# Patient Record
Sex: FEMALE | Race: OTHER | HISPANIC OR LATINO
[De-identification: names, ages, dates, MRNs, and addresses within clinical notes are randomized per-mention and may not be internally consistent; named-entity substitution may affect disease eponyms.]

---

## 2021-11-17 ENCOUNTER — APPOINTMENT (OUTPATIENT)
Dept: NEUROSURGERY | Facility: CLINIC | Age: 61
End: 2021-11-17
Payer: MEDICAID

## 2021-11-17 PROCEDURE — 99442: CPT | Mod: 95

## 2021-11-18 ENCOUNTER — APPOINTMENT (OUTPATIENT)
Dept: NEUROSURGERY | Facility: CLINIC | Age: 61
End: 2021-11-18

## 2021-12-23 ENCOUNTER — APPOINTMENT (OUTPATIENT)
Dept: NEUROSURGERY | Facility: CLINIC | Age: 61
End: 2021-12-23

## 2021-12-23 ENCOUNTER — APPOINTMENT (OUTPATIENT)
Dept: NEUROSURGERY | Facility: CLINIC | Age: 61
End: 2021-12-23
Payer: MEDICAID

## 2021-12-23 VITALS
HEIGHT: 57 IN | OXYGEN SATURATION: 95 % | DIASTOLIC BLOOD PRESSURE: 79 MMHG | HEART RATE: 83 BPM | WEIGHT: 134 LBS | TEMPERATURE: 97.6 F | BODY MASS INDEX: 28.91 KG/M2 | SYSTOLIC BLOOD PRESSURE: 136 MMHG

## 2021-12-23 PROCEDURE — 99213 OFFICE O/P EST LOW 20 MIN: CPT

## 2021-12-27 NOTE — PHYSICAL EXAM
[General Appearance - Alert] : alert [General Appearance - In No Acute Distress] : in no acute distress [Oriented To Time, Place, And Person] : oriented to person, place, and time [Impaired Insight] : insight and judgment were intact [Affect] : the affect was normal [Person] : oriented to person [Place] : oriented to place [Time] : oriented to time [Short Term Intact] : short term memory intact [Remote Intact] : remote memory intact [Span Intact] : the attention span was normal [Concentration Intact] : normal concentrating ability [Fluency] : fluency intact [Comprehension] : comprehension intact [Current Events] : adequate knowledge of current events [Past History] : adequate knowledge of personal past history [Vocabulary] : adequate range of vocabulary [Cranial Nerves Optic (II)] : visual acuity intact bilaterally,  pupils equal round and reactive to light [Cranial Nerves Oculomotor (III)] : extraocular motion intact [Cranial Nerves Trigeminal (V)] : facial sensation intact symmetrically [Cranial Nerves Facial (VII)] : face symmetrical [Cranial Nerves Vestibulocochlear (VIII)] : hearing was intact bilaterally [Cranial Nerves Glossopharyngeal (IX)] : tongue and palate midline [Cranial Nerves Accessory (XI - Cranial And Spinal)] : head turning and shoulder shrug symmetric [Cranial Nerves Hypoglossal (XII)] : there was no tongue deviation with protrusion [Motor Tone] : muscle tone was normal in all four extremities [Motor Strength] : muscle strength was normal in all four extremities [No Muscle Atrophy] : normal bulk in all four extremities [Sensation Tactile Decrease] : light touch was intact [Abnormal Walk] : normal gait [Balance] : balance was intact [2+] : Patella left 2+ [Past-pointing] : there was no past-pointing [Tremor] : no tremor present [FreeTextEntry5] : obvious frontal deformity on the right side. [FreeTextEntry1] : Osteoma on forehead

## 2021-12-27 NOTE — HISTORY OF PRESENT ILLNESS
[> 3 months] : more  than 3 months [de-identified] : Mrs. Montse Brunson, is a 60 y/o, female with a hx of osteoma on forehead and lupus. She states the area is painful especially to touch and feels that it is increasing in size. In addition to these she feels that it is more pronounced now.She is  today to discuss removal of osteoma .She reports she is now ready to undergo surgery. The osteoma is painful to palpation at times and causes her disturbance to her physical appearance.

## 2021-12-27 NOTE — ASSESSMENT
[FreeTextEntry1] : The patient is a 62 y/o, female with an  osteoma on forehead that she has noticed enlarging,  painful and disfiguring. At this point I think the best option is surgery to remove the lesion.\par - CT head discussed with pt in detail \par - Discussed risks and benefits of elective surgery. Pt will like to undergo excision. \par - Tentative surgery date provided.

## 2022-01-12 ENCOUNTER — OUTPATIENT (OUTPATIENT)
Dept: OUTPATIENT SERVICES | Facility: HOSPITAL | Age: 62
LOS: 1 days | End: 2022-01-12
Payer: MEDICAID

## 2022-01-12 VITALS
HEART RATE: 82 BPM | OXYGEN SATURATION: 98 % | HEIGHT: 60 IN | SYSTOLIC BLOOD PRESSURE: 110 MMHG | DIASTOLIC BLOOD PRESSURE: 70 MMHG | TEMPERATURE: 97 F | RESPIRATION RATE: 17 BRPM | WEIGHT: 139.99 LBS

## 2022-01-12 DIAGNOSIS — M32.9 SYSTEMIC LUPUS ERYTHEMATOSUS, UNSPECIFIED: ICD-10-CM

## 2022-01-12 DIAGNOSIS — D16.9 BENIGN NEOPLASM OF BONE AND ARTICULAR CARTILAGE, UNSPECIFIED: ICD-10-CM

## 2022-01-12 DIAGNOSIS — Z98.891 HISTORY OF UTERINE SCAR FROM PREVIOUS SURGERY: Chronic | ICD-10-CM

## 2022-01-12 DIAGNOSIS — Z90.710 ACQUIRED ABSENCE OF BOTH CERVIX AND UTERUS: Chronic | ICD-10-CM

## 2022-01-12 DIAGNOSIS — Z96.651 PRESENCE OF RIGHT ARTIFICIAL KNEE JOINT: Chronic | ICD-10-CM

## 2022-01-12 DIAGNOSIS — Z98.84 BARIATRIC SURGERY STATUS: Chronic | ICD-10-CM

## 2022-01-12 LAB
ANION GAP SERPL CALC-SCNC: 11 MMOL/L — SIGNIFICANT CHANGE UP (ref 7–14)
BLD GP AB SCN SERPL QL: NEGATIVE — SIGNIFICANT CHANGE UP
BUN SERPL-MCNC: 22 MG/DL — SIGNIFICANT CHANGE UP (ref 7–23)
CALCIUM SERPL-MCNC: 9.7 MG/DL — SIGNIFICANT CHANGE UP (ref 8.4–10.5)
CHLORIDE SERPL-SCNC: 105 MMOL/L — SIGNIFICANT CHANGE UP (ref 98–107)
CO2 SERPL-SCNC: 26 MMOL/L — SIGNIFICANT CHANGE UP (ref 22–31)
CREAT SERPL-MCNC: 0.54 MG/DL — SIGNIFICANT CHANGE UP (ref 0.5–1.3)
GLUCOSE SERPL-MCNC: 88 MG/DL — SIGNIFICANT CHANGE UP (ref 70–99)
HCT VFR BLD CALC: 38.9 % — SIGNIFICANT CHANGE UP (ref 34.5–45)
HGB BLD-MCNC: 12.3 G/DL — SIGNIFICANT CHANGE UP (ref 11.5–15.5)
MCHC RBC-ENTMCNC: 29.6 PG — SIGNIFICANT CHANGE UP (ref 27–34)
MCHC RBC-ENTMCNC: 31.6 GM/DL — LOW (ref 32–36)
MCV RBC AUTO: 93.5 FL — SIGNIFICANT CHANGE UP (ref 80–100)
NRBC # BLD: 0 /100 WBCS — SIGNIFICANT CHANGE UP
NRBC # FLD: 0 K/UL — SIGNIFICANT CHANGE UP
PLATELET # BLD AUTO: 245 K/UL — SIGNIFICANT CHANGE UP (ref 150–400)
POTASSIUM SERPL-MCNC: 4.2 MMOL/L — SIGNIFICANT CHANGE UP (ref 3.5–5.3)
POTASSIUM SERPL-SCNC: 4.2 MMOL/L — SIGNIFICANT CHANGE UP (ref 3.5–5.3)
RBC # BLD: 4.16 M/UL — SIGNIFICANT CHANGE UP (ref 3.8–5.2)
RBC # FLD: 14.1 % — SIGNIFICANT CHANGE UP (ref 10.3–14.5)
RH IG SCN BLD-IMP: POSITIVE — SIGNIFICANT CHANGE UP
SODIUM SERPL-SCNC: 142 MMOL/L — SIGNIFICANT CHANGE UP (ref 135–145)
WBC # BLD: 7.1 K/UL — SIGNIFICANT CHANGE UP (ref 3.8–10.5)
WBC # FLD AUTO: 7.1 K/UL — SIGNIFICANT CHANGE UP (ref 3.8–10.5)

## 2022-01-12 PROCEDURE — 93010 ELECTROCARDIOGRAM REPORT: CPT

## 2022-01-12 RX ORDER — SODIUM CHLORIDE 9 MG/ML
1000 INJECTION, SOLUTION INTRAVENOUS
Refills: 0 | Status: DISCONTINUED | OUTPATIENT
Start: 2022-01-26 | End: 2022-01-26

## 2022-01-12 NOTE — H&P PST ADULT - NEUROLOGICAL COMMENTS
pt reports of head aches in frontal area. pre op diagnosis - Osteoma on forehead- tenderness to palpation

## 2022-01-12 NOTE — H&P PST ADULT - RS GEN PE MLT RESP DETAILS PC
airway patent/breath sounds equal/good air movement/respirations non-labored/no subcutaneous emphysema

## 2022-01-12 NOTE — H&P PST ADULT - NSANTHOSAYNRD_GEN_A_CORE
No. JANNET screening performed.  STOP BANG Legend: 0-2 = LOW Risk; 3-4 = INTERMEDIATE Risk; 5-8 = HIGH Risk

## 2022-01-12 NOTE — H&P PST ADULT - PROBLEM SELECTOR PLAN 2
Patient instructed to take leflunomide, hydroxyquine with a sip of water on the morning of procedure.

## 2022-01-12 NOTE — H&P PST ADULT - PROBLEM SELECTOR PLAN 1
Pt is tentatively scheduled for Craniectomy for excision of osteoma on 01/26/22.  Pepcid provided for GI prophylaxis.  Pre op instructions givens  Pt strongly advised to follow up with surgeon to discuss COVID testing requirements prior to procedure.    Pt with history of Lupus, arthritis- Requesting medical clearance- pt already had seen her PCP on 01/10/22, will obtain clearance.

## 2022-01-12 NOTE — H&P PST ADULT - NEUROLOGICAL SYMPTOMS
Post discharge with PCP scheduled for 12/5/2017 at 1:00 p.m.  Information placed on the AVS.  Katy Castellanos LMSW, [facility]  11/28/2017   headache

## 2022-01-12 NOTE — H&P PST ADULT - HISTORY OF PRESENT ILLNESS
61 year old female with PMH of Lupus, Arthritis 61 year old female with PMH of Lupus, Arthritis, Glaucoma presented to PST with pre op diagnosis of Osteoma on forehead, for pre op evaluation prior to scheduled surgery- Craniectomy for excision of Osteoma. Pt reports of lump on forehead for 8 years, recently experiencing pain and discomfort, intermittent headaches too. Pt seen by  for excision.

## 2022-01-12 NOTE — H&P PST ADULT - NSICDXPASTSURGICALHX_GEN_ALL_CORE_FT
PAST SURGICAL HISTORY:  H/O  section     S/P gastric bypass     S/P hysterectomy     S/P total knee replacement, right

## 2022-01-25 ENCOUNTER — TRANSCRIPTION ENCOUNTER (OUTPATIENT)
Age: 62
End: 2022-01-25

## 2022-01-25 NOTE — ASU PATIENT PROFILE, ADULT - ARRIVAL TIME
11:30 Is This A New Presentation, Or A Follow-Up?: Growth Which Family Member (Optional)?: Grandfather

## 2022-01-26 ENCOUNTER — APPOINTMENT (OUTPATIENT)
Dept: NEUROSURGERY | Facility: HOSPITAL | Age: 62
End: 2022-01-26
Payer: MEDICAID

## 2022-01-26 ENCOUNTER — INPATIENT (INPATIENT)
Facility: HOSPITAL | Age: 62
LOS: 0 days | Discharge: HOME | End: 2022-01-26
Attending: NEUROLOGICAL SURGERY | Admitting: NEUROLOGICAL SURGERY
Payer: MEDICAID

## 2022-01-26 ENCOUNTER — RESULT REVIEW (OUTPATIENT)
Age: 62
End: 2022-01-26

## 2022-01-26 VITALS
DIASTOLIC BLOOD PRESSURE: 70 MMHG | OXYGEN SATURATION: 96 % | RESPIRATION RATE: 18 BRPM | HEART RATE: 67 BPM | SYSTOLIC BLOOD PRESSURE: 106 MMHG | TEMPERATURE: 97 F

## 2022-01-26 VITALS
WEIGHT: 139.99 LBS | HEIGHT: 60 IN | TEMPERATURE: 99 F | SYSTOLIC BLOOD PRESSURE: 137 MMHG | HEART RATE: 75 BPM | DIASTOLIC BLOOD PRESSURE: 74 MMHG | OXYGEN SATURATION: 100 % | RESPIRATION RATE: 16 BRPM

## 2022-01-26 DIAGNOSIS — Z90.710 ACQUIRED ABSENCE OF BOTH CERVIX AND UTERUS: Chronic | ICD-10-CM

## 2022-01-26 DIAGNOSIS — D16.9 BENIGN NEOPLASM OF BONE AND ARTICULAR CARTILAGE, UNSPECIFIED: ICD-10-CM

## 2022-01-26 DIAGNOSIS — Z98.891 HISTORY OF UTERINE SCAR FROM PREVIOUS SURGERY: Chronic | ICD-10-CM

## 2022-01-26 DIAGNOSIS — Z96.651 PRESENCE OF RIGHT ARTIFICIAL KNEE JOINT: Chronic | ICD-10-CM

## 2022-01-26 DIAGNOSIS — Z98.84 BARIATRIC SURGERY STATUS: Chronic | ICD-10-CM

## 2022-01-26 LAB — RH IG SCN BLD-IMP: POSITIVE — SIGNIFICANT CHANGE UP

## 2022-01-26 PROCEDURE — 88307 TISSUE EXAM BY PATHOLOGIST: CPT | Mod: 26

## 2022-01-26 PROCEDURE — 88311 DECALCIFY TISSUE: CPT | Mod: 26

## 2022-01-26 PROCEDURE — 61500 CRNEC EXC TUM/BONE LES SKULL: CPT

## 2022-01-26 DEVICE — SURGIFOAM PAD 8CM X 12.5CM X 2MM (100C): Type: IMPLANTABLE DEVICE | Status: FUNCTIONAL

## 2022-01-26 DEVICE — SURGIFLO MATRIX WITH THROMBIN KIT: Type: IMPLANTABLE DEVICE | Status: FUNCTIONAL

## 2022-01-26 DEVICE — BONE WAX 2.5GM: Type: IMPLANTABLE DEVICE | Status: FUNCTIONAL

## 2022-01-26 RX ORDER — DULOXETINE HYDROCHLORIDE 30 MG/1
60 CAPSULE, DELAYED RELEASE ORAL DAILY
Refills: 0 | Status: DISCONTINUED | OUTPATIENT
Start: 2022-01-26 | End: 2022-01-26

## 2022-01-26 RX ORDER — GABAPENTIN 400 MG/1
800 CAPSULE ORAL DAILY
Refills: 0 | Status: DISCONTINUED | OUTPATIENT
Start: 2022-01-26 | End: 2022-01-26

## 2022-01-26 RX ORDER — OXYCODONE HYDROCHLORIDE 5 MG/1
10 TABLET ORAL EVERY 4 HOURS
Refills: 0 | Status: DISCONTINUED | OUTPATIENT
Start: 2022-01-26 | End: 2022-01-26

## 2022-01-26 RX ORDER — OXYCODONE HYDROCHLORIDE 5 MG/1
5 TABLET ORAL EVERY 4 HOURS
Refills: 0 | Status: DISCONTINUED | OUTPATIENT
Start: 2022-01-26 | End: 2022-01-26

## 2022-01-26 RX ORDER — CEPHALEXIN 500 MG
1 CAPSULE ORAL
Qty: 2 | Refills: 0
Start: 2022-01-26 | End: 2022-01-26

## 2022-01-26 RX ORDER — LATANOPROST 0.05 MG/ML
1 SOLUTION/ DROPS OPHTHALMIC; TOPICAL AT BEDTIME
Refills: 0 | Status: DISCONTINUED | OUTPATIENT
Start: 2022-01-26 | End: 2022-01-26

## 2022-01-26 RX ORDER — HYDROXYCHLOROQUINE SULFATE 200 MG
200 TABLET ORAL DAILY
Refills: 0 | Status: DISCONTINUED | OUTPATIENT
Start: 2022-01-26 | End: 2022-01-26

## 2022-01-26 RX ORDER — HYDROXYCHLOROQUINE SULFATE 200 MG
1 TABLET ORAL
Qty: 0 | Refills: 0 | DISCHARGE

## 2022-01-26 RX ORDER — LATANOPROST 0.05 MG/ML
1 SOLUTION/ DROPS OPHTHALMIC; TOPICAL
Qty: 0 | Refills: 0 | DISCHARGE

## 2022-01-26 RX ORDER — ALENDRONATE SODIUM 70 MG/1
1 TABLET ORAL
Qty: 0 | Refills: 0 | DISCHARGE

## 2022-01-26 RX ORDER — DULOXETINE HYDROCHLORIDE 30 MG/1
1 CAPSULE, DELAYED RELEASE ORAL
Qty: 0 | Refills: 0 | DISCHARGE

## 2022-01-26 RX ORDER — TRAMADOL HYDROCHLORIDE 50 MG/1
1 TABLET ORAL
Qty: 0 | Refills: 0 | DISCHARGE

## 2022-01-26 RX ORDER — LEFLUNOMIDE 10 MG/1
1 TABLET ORAL
Qty: 0 | Refills: 0 | DISCHARGE

## 2022-01-26 RX ORDER — GABAPENTIN 400 MG/1
1 CAPSULE ORAL
Qty: 0 | Refills: 0 | DISCHARGE

## 2022-01-26 NOTE — ASU DISCHARGE PLAN (ADULT/PEDIATRIC) - CARE PROVIDER_API CALL
Kd Frederick (MD; CHUY; MS)  Neurosurgery  [address], [address]  [place] [address]  Phone: [phone]  Fax: [phone]  Follow Up Time: 1 week

## 2022-01-26 NOTE — ASU DISCHARGE PLAN (ADULT/PEDIATRIC) - NS MD DC FALL RISK RISK
For information on Fall & Injury Prevention, visit: [email or web address] OR  [email or web address] OR  https://www.cdc.gov/steadi/patient.html

## 2022-01-26 NOTE — ASU DISCHARGE PLAN (ADULT/PEDIATRIC) - FOLLOW UP APPOINTMENTS
May also call Recovery Room (PACU) 24/7 @ [phone]/[facility]

## 2022-01-26 NOTE — ASU DISCHARGE PLAN (ADULT/PEDIATRIC) - NURSING INSTRUCTIONS
You received IV Tylenol for pain management at _4:45pm__. Please DO NOT take any Tylenol (Acetaminophen) containing products, such as Vicodin, Percocet, Excedrin, and cold medications for the next 6 hours (until __10:45_ PM). DO NOT TAKE MORE THAN 3000 MG OF TYLENOL in a 24 hour period.

## 2022-01-26 NOTE — ASU PREOP CHECKLIST - HEART RATE (BEATS/MIN)
Trilobed Flap Text: The defect edges were debeveled with a #15 scalpel blade.  Given the location of the defect and the proximity to free margins a trilobed flap was deemed most appropriate.  Using a sterile surgical marker, an appropriate trilobed flap drawn around the defect.    The area thus outlined was incised deep to adipose tissue with a #15 scalpel blade.  The skin margins were undermined to an appropriate distance in all directions utilizing iris scissors. Undermining Location (Optional): in the deep fat Graft Donor Site Will Heal By Secondary Intention: No Suturegard Retention Suture: 2-0 Nylon Double M-Plasty Intermediate Repair Preamble Text (Leave Blank If You Do Not Want): Undermining was performed with blunt dissection. Paramedian Forehead Flap Division And Inset Text: Division and inset of the paramedian forehead flap was performed to achieve optimal aesthetic result, restore normal anatomic appearance and avoid distortion of normal anatomy, expedite and facilitate wound healing, achieve optimal functional result and because linear closure either not possible or would produce suboptimal result. The patient was prepped and draped in the usual manner. The pedicle was infiltrated with local anesthesia. The pedicle was sectioned with a #15 blade. The pedicle was de-bulked and trimmed to match the shape of the defect. Hemostasis was achieved. The flap donor site and free margin of the flap were secured with deep buried sutures and the wound edges were re-approximated. Mastoid Interpolation Flap Text: A decision was made to reconstruct the defect utilizing an interpolation axial flap and a staged reconstruction.  A telfa template was made of the defect.  This telfa template was then used to outline the mastoid interpolation flap.  The donor area for the pedicle flap was then injected with anesthesia.  The flap was excised through the skin and subcutaneous tissue down to the layer of the underlying musculature.  The pedicle flap was carefully excised within this deep plane to maintain its blood supply.  The edges of the donor site were undermined.   The donor site was closed in a primary fashion.  The pedicle was then rotated into position and sutured.  Once the tube was sutured into place, adequate blood supply was confirmed with blanching and refill.  The pedicle was then wrapped with xeroform gauze and dressed appropriately with a telfa and gauze bandage to ensure continued blood supply and protect the attached pedicle. O-Z Flap Text: The defect edges were debeveled with a #15 scalpel blade.  Given the location of the defect, shape of the defect and the proximity to free margins an O-Z flap was deemed most appropriate.  Using a sterile surgical marker, an appropriate transposition flap was drawn incorporating the defect and placing the expected incisions within the relaxed skin tension lines where possible. The area thus outlined was incised deep to adipose tissue with a #15 scalpel blade.  The skin margins were undermined to an appropriate distance in all directions utilizing iris scissors. Purse String (Intermediate) Text: Given the location of the defect and the characteristics of the surrounding skin a purse string intermediate closure was deemed most appropriate.  Undermining was performed circumfirentially around the surgical defect.  A purse string suture was then placed and tightened. Closure 3 Information: This tab is for additional flaps and grafts above and beyond our usual structured repairs.  Please note if you enter information here it will not currently bill and you will need to add the billing information manually. Bilobed Transposition Flap Text: The defect edges were debeveled with a #15 scalpel blade.  Given the location of the defect and the proximity to free margins a bilobed transposition flap was deemed most appropriate.  Using a sterile surgical marker, an appropriate bilobe flap drawn around the defect.    The area thus outlined was incised deep to adipose tissue with a #15 scalpel blade.  The skin margins were undermined to an appropriate distance in all directions utilizing iris scissors. Non-Graft Cartilage Fenestration Text: The cartilage was fenestrated with a 2mm punch biopsy to help facilitate healing. Posterior Auricular Interpolation Flap Text: A decision was made to reconstruct the defect utilizing an interpolation axial flap and a staged reconstruction.  A telfa template was made of the defect.  This telfa template was then used to outline the posterior auricular interpolation flap.  The donor area for the pedicle flap was then injected with anesthesia.  The flap was excised through the skin and subcutaneous tissue down to the layer of the underlying musculature.  The pedicle flap was carefully excised within this deep plane to maintain its blood supply.  The edges of the donor site were undermined.   The donor site was closed in a primary fashion.  The pedicle was then rotated into position and sutured.  Once the tube was sutured into place, adequate blood supply was confirmed with blanching and refill.  The pedicle was then wrapped with xeroform gauze and dressed appropriately with a telfa and gauze bandage to ensure continued blood supply and protect the attached pedicle. Double O-Z Flap Text: The defect edges were debeveled with a #15 scalpel blade.  Given the location of the defect, shape of the defect and the proximity to free margins a Double O-Z flap was deemed most appropriate.  Using a sterile surgical marker, an appropriate transposition flap was drawn incorporating the defect and placing the expected incisions within the relaxed skin tension lines where possible. The area thus outlined was incised deep to adipose tissue with a #15 scalpel blade.  The skin margins were undermined to an appropriate distance in all directions utilizing iris scissors. Partial Purse String (Simple) Text: Given the location of the defect and the characteristics of the surrounding skin a simple purse string closure was deemed most appropriate.  Undermining was performed circumfirentially around the surgical defect.  A purse string suture was then placed and tightened. Wound tension only allowed a partial closure of the circular defect. Posterior Auricular Interpolation Flap Division And Inset Text: Division and inset of the posterior auricular interpolation flap was performed to achieve optimal aesthetic result, restore normal anatomic appearance and avoid distortion of normal anatomy, expedite and facilitate wound healing, achieve optimal functional result and because linear closure either not possible or would produce suboptimal result. The patient was prepped and draped in the usual manner. The pedicle was infiltrated with local anesthesia. The pedicle was sectioned with a #15 blade. The pedicle was de-bulked and trimmed to match the shape of the defect. Hemostasis was achieved. The flap donor site and free margin of the flap were secured with deep buried sutures and the wound edges were re-approximated. Graft Donor Site Dermal Sutures (Optional): 5-0 Monocryl Bilobed Flap Text: The defect edges were debeveled with a #15 scalpel blade.  Given the location of the defect and the proximity to free margins a bilobe flap was deemed most appropriate.  Using a sterile surgical marker, an appropriate bilobe flap drawn around the defect.    The area thus outlined was incised deep to adipose tissue with a #15 scalpel blade.  The skin margins were undermined to an appropriate distance in all directions utilizing iris scissors. Suture Removal: 9 days Retention Suture Bite Size: 3 mm Skin Substitute: EpiFix Micronized V-Y Flap Text: The defect edges were debeveled with a #15 scalpel blade.  Given the location of the defect, shape of the defect and the proximity to free margins a V-Y flap was deemed most appropriate.  Using a sterile surgical marker, an appropriate advancement flap was drawn incorporating the defect and placing the expected incisions within the relaxed skin tension lines where possible.    The area thus outlined was incised deep to adipose tissue with a #15 scalpel blade.  The skin margins were undermined to an appropriate distance in all directions utilizing iris scissors. Consent: The rationale for the repair was explained to the patient and consent was obtained. The risks, benefits and alternatives to therapy were discussed in detail. Specifically, the risks of infection, scarring, bleeding, prolonged wound healing, incomplete removal, allergy to anesthesia, nerve injury and recurrence were addressed. Prior to the procedure, the treatment site was clearly identified and confirmed by the patient. All components of Universal Protocol/PAUSE Rule completed. Paramedian Forehead Flap Text: A decision was made to reconstruct the defect utilizing an interpolation axial flap and a staged reconstruction.  A telfa template was made of the defect.  This telfa template was then used to outline the paramedian forehead pedicle flap.  The donor area for the pedicle flap was then injected with anesthesia.  The flap was excised through the skin and subcutaneous tissue down to the layer of the underlying musculature.  The pedicle flap was carefully excised within this deep plane to maintain its blood supply.  The edges of the donor site were undermined.   The donor site was closed in a primary fashion.  The pedicle was then rotated into position and sutured.  Once the tube was sutured into place, adequate blood supply was confirmed with blanching and refill.  The pedicle was then wrapped with xeroform gauze and dressed appropriately with a telfa and gauze bandage to ensure continued blood supply and protect the attached pedicle. Graft Cartilage Fenestration Text: The cartilage was fenestrated with a 2mm punch biopsy to help facilitate graft survival and healing. Partial Purse String (Intermediate) Text: Given the location of the defect and the characteristics of the surrounding skin an intermediate purse string closure was deemed most appropriate.  Undermining was performed circumfirentially around the surgical defect.  A purse string suture was then placed and tightened. Wound tension only allowed a partial closure of the circular defect. Manual Repair Warning Statement: We plan on removing the manually selected variable below in favor of our much easier automatic structured text blocks found in the previous tab. We decided to do this to help make the flow better and give you the full power of structured data. Manual selection is never going to be ideal in our platform and I would encourage you to avoid using manual selection from this point on, especially since I will be sunsetting this feature. It is important that you do one of two things with the customized text below. First, you can save all of the text in a word file so you can have it for future reference. Second, transfer the text to the appropriate area in the Library tab. Lastly, if there is a flap or graft type which we do not have you need to let us know right away so I can add it in before the variable is hidden. No need to panic, we plan to give you roughly 6 months to make the change. Banner Transposition Flap Text: The defect edges were debeveled with a #15 scalpel blade.  Given the location of the defect and the proximity to free margins a Banner transposition flap was deemed most appropriate.  Using a sterile surgical marker, an appropriate flap drawn around the defect. The area thus outlined was incised deep to adipose tissue with a #15 scalpel blade.  The skin margins were undermined to an appropriate distance in all directions utilizing iris scissors. Secondary Intention Text (Leave Blank If You Do Not Want): The defect will heal with secondary intention. Cheiloplasty (Less Than 50%) Text: A decision was made to reconstruct the defect with a  cheiloplasty.  The defect was undermined extensively.  Additional obicularis oris muscle was excised with a 15 blade scalpel.  The defect was converted into a full thickness wedge, of less than 50% of the vertical height of the lip, to facilite a better cosmetic result.  Small vessels were then tied off with 5-0 monocyrl. The obicularis oris, superficial fascia, adipose and dermis were then reapproximated.  After the deeper layers were approximated the epidermis was reapproximated with particular care given to realign the vermilion border. Post-Care Instructions: I reviewed with the patient in detail post-care instructions. Patient is not to engage in any heavy lifting, exercise, or swimming for the next 9 days. Should the patient develop any fevers, chills, bleeding, severe pain patient will contact the office immediately. Localized Dermabrasion Text: The patient was draped in routine manner.  Localized dermabrasion using 3 x 17 mm wire brush was performed in routine manner to papillary dermis. This spot dermabrasion is being performed to complete skin cancer reconstruction. It also will eliminate the other sun damaged precancerous cells that are known to be part of the regional effect of a lifetime's worth of sun exposure. This localized dermabrasion is therapeutic and should not be considered cosmetic in any regard. Advancement-Rotation Flap Text: The defect edges were debeveled with a #15 scalpel blade.  Given the location of the defect, shape of the defect and the proximity to free margins an advancement-rotation flap was deemed most appropriate.  Using a sterile surgical marker, an appropriate flap was drawn incorporating the defect and placing the expected incisions within the relaxed skin tension lines where possible. The area thus outlined was incised deep to adipose tissue with a #15 scalpel blade.  The skin margins were undermined to an appropriate distance in all directions utilizing iris scissors. Graft Donor Site Epidermal Sutures (Optional): 5-0 Prolene Ear Star Wedge Flap Text: The defect edges were debeveled with a #15 blade scalpel.  Given the location of the defect and the proximity to free margins (helical rim) an ear star wedge flap was deemed most appropriate.  Using a sterile surgical marker, the appropriate flap was drawn incorporating the defect and placing the expected incisions between the helical rim and antihelix where possible.  The area thus outlined was incised through and through with a #15 scalpel blade. Epidermal Closure: simple interrupted No Repair - Repaired With Adjacent Surgical Defect Text (Leave Blank If You Do Not Want): After obtaining clear surgical margins the defect was repaired concurrently with another surgical defect which was in close approximation. Length To Time In Minutes Device Was In Place: 10 Skin Substitute Units (Will Override Primary Defect Units If Greater Than 0): 0 Mercedes Flap Text: The defect edges were debeveled with a #15 scalpel blade.  Given the location of the defect, shape of the defect and the proximity to free margins a Mercedes flap was deemed most appropriate.  Using a sterile surgical marker, an appropriate advancement flap was drawn incorporating the defect and placing the expected incisions within the relaxed skin tension lines where possible. The area thus outlined was incised deep to adipose tissue with a #15 scalpel blade.  The skin margins were undermined to an appropriate distance in all directions utilizing iris scissors. Tarsorrhaphy Text: A tarsorrhaphy was performed using Frost sutures. 75 Bilateral Helical Rim Advancement Flap Text: The defect edges were debeveled with a #15 blade scalpel.  Given the location of the defect and the proximity to free margins (helical rim) a bilateral helical rim advancement flap was deemed most appropriate.  Using a sterile surgical marker, the appropriate advancement flaps were drawn incorporating the defect and placing the expected incisions between the helical rim and antihelix where possible.  The area thus outlined was incised through and through with a #15 scalpel blade.  With a skin hook and iris scissors, the flaps were gently and sharply undermined and freed up. Cheiloplasty (Complex) Text: A decision was made to reconstruct the defect with a  cheiloplasty.  The defect was undermined extensively.  Additional obicularis oris muscle was excised with a 15 blade scalpel.  The defect was converted into a full thickness wedge to facilite a better cosmetic result.  Small vessels were then tied off with 5-0 monocyrl. The obicularis oris, superficial fascia, adipose and dermis were then reapproximated.  After the deeper layers were approximated the epidermis was reapproximated with particular care given to realign the vermilion border. Anesthesia Type: 1% lidocaine with 1:100,000 epinephrine and 408mcg clindamycin/ml and a 1:10 solution of 8.4% sodium bicarbonate Modified Advancement Flap Text: The defect edges were debeveled with a #15 scalpel blade.  Given the location of the defect, shape of the defect and the proximity to free margins a modified advancement flap was deemed most appropriate.  Using a sterile surgical marker, an appropriate advancement flap was drawn incorporating the defect and placing the expected incisions within the relaxed skin tension lines where possible.    The area thus outlined was incised deep to adipose tissue with a #15 scalpel blade.  The skin margins were undermined to an appropriate distance in all directions utilizing iris scissors. Closure 4 Information: This tab is for additional flaps and grafts above and beyond our usual structured repairs.  Please note if you enter information here it will not currently bill and you will need to add the billing information manually. Referred To Oculoplastics For Closure Text (Leave Blank If You Do Not Want): After obtaining clear surgical margins the patient was sent to oculoplastics for surgical repair.  The patient understands they will receive post-surgical care and follow-up from the referring physician's office. Complex Repair And Flap Additional Text (Will Appearing After The Standard Complex Repair Text): The complex repair was not sufficient to completely close the primary defect. The remaining additional defect was repaired with the flap mentioned below. Helical Rim Advancement Flap Text: The defect edges were debeveled with a #15 blade scalpel.  Given the location of the defect and the proximity to free margins (helical rim) a double helical rim advancement flap was deemed most appropriate.  Using a sterile surgical marker, the appropriate advancement flaps were drawn incorporating the defect and placing the expected incisions between the helical rim and antihelix where possible.  The area thus outlined was incised through and through with a #15 scalpel blade.  With a skin hook and iris scissors, the flaps were gently and sharply undermined and freed up. Ear Wedge Repair Text: A wedge excision was completed by carrying down an excision through the full thickness of the ear and cartilage with an inward facing Burow's triangle. The wound was then closed in a layered fashion. Type Of Previous Surgery (Optional- Ie Mohs Surgery): Slow Mons margins clear Referred To Otolaryngology For Closure Text (Leave Blank If You Do Not Want): After obtaining clear surgical margins the patient was sent to otolaryngology for surgical repair.  The patient understands they will receive post-surgical care and follow-up from the referring physician's office. Complex Repair And Graft Additional Text (Will Appearing After The Standard Complex Repair Text): The complex repair was not sufficient to completely close the primary defect. The remaining additional defect was repaired with the graft mentioned below. Mucosal Advancement Flap Text: Given the location of the defect, shape of the defect and the proximity to free margins a mucosal advancement flap was deemed most appropriate. Incisions were made with a 15 blade scalpel in the appropriate fashion along the cutaneous vermilion border and the mucosal lip. The remaining actinically damaged mucosal tissue was excised.  The mucosal advancement flap was then elevated to the gingival sulcus with care taken to preserve the neurovascular structures and advanced into the primary defect. Care was taken to ensure that precise realignment of the vermilion border was achieved. Bi-Rhombic Flap Text: The defect edges were debeveled with a #15 scalpel blade.  Given the location of the defect and the proximity to free margins a bi-rhombic flap was deemed most appropriate.  Using a sterile surgical marker, an appropriate rhombic flap was drawn incorporating the defect. The area thus outlined was incised deep to adipose tissue with a #15 scalpel blade.  The skin margins were undermined to an appropriate distance in all directions utilizing iris scissors. Full Thickness Lip Wedge Repair (Flap) Text: Given the location of the defect and the proximity to free margins a full thickness wedge repair was deemed most appropriate.  Using a sterile surgical marker, the appropriate repair was drawn incorporating the defect and placing the expected incisions perpendicular to the vermilion border.  The vermilion border was also meticulously outlined to ensure appropriate reapproximation during the repair.  The area thus outlined was incised through and through with a #15 scalpel blade.  The muscularis and dermis were reaproximated with deep sutures following hemostasis. Care was taken to realign the vermilion border before proceeding with the superficial closure.  Once the vermilion was realigned the superfical and mucosal closure was finished. Show Asc Variables: Yes Graft Donor Site Bandage (Optional-Leave Blank If You Don't Want In Note): Steri-strips and a pressure bandage were applied to the donor site. Hatchet Flap Text: The defect edges were debeveled with a #15 scalpel blade.  Given the location of the defect, shape of the defect and the proximity to free margins a hatchet flap was deemed most appropriate.  Using a sterile surgical marker, an appropriate hatchet flap was drawn incorporating the defect and placing the expected incisions within the relaxed skin tension lines where possible.    The area thus outlined was incised deep to adipose tissue with a #15 scalpel blade.  The skin margins were undermined to an appropriate distance in all directions utilizing iris scissors. O-T Plasty Text: The defect edges were debeveled with a #15 scalpel blade.  Given the location of the defect, shape of the defect and the proximity to free margins an O-T plasty was deemed most appropriate.  Using a sterile surgical marker, an appropriate O-T plasty was drawn incorporating the defect and placing the expected incisions within the relaxed skin tension lines where possible.    The area thus outlined was incised deep to adipose tissue with a #15 scalpel blade.  The skin margins were undermined to an appropriate distance in all directions utilizing iris scissors. Rhomboid Transposition Flap Text: The defect edges were debeveled with a #15 scalpel blade.  Given the location of the defect and the proximity to free margins a rhomboid transposition flap was deemed most appropriate.  Using a sterile surgical marker, an appropriate rhomboid flap was drawn incorporating the defect.    The area thus outlined was incised deep to adipose tissue with a #15 scalpel blade.  The skin margins were undermined to an appropriate distance in all directions utilizing iris scissors. Referred To Plastics For Closure Text (Leave Blank If You Do Not Want): After obtaining clear surgical margins the patient was sent to plastics for surgical repair.  The patient understands they will receive post-surgical care and follow-up from the referring physician's office. Ftsg Text: The defect edges were debeveled with a #15 scalpel blade.  Given the location of the defect, shape of the defect and the proximity to free margins a full thickness skin graft was deemed most appropriate.  Using a sterile surgical marker, the primary defect shape was transferred to the donor site. The area thus outlined was incised deep to adipose tissue with a #15 scalpel blade.  The harvested graft was then trimmed of adipose tissue until only dermis and epidermis was left.  The skin margins of the secondary defect were undermined to an appropriate distance in all directions utilizing iris scissors.  The secondary defect was closed with interrupted buried subcutaneous sutures.  The skin edges were then re-apposed with running  sutures.  The skin graft was then placed in the primary defect and oriented appropriately. Wound Care: Petrolatum Referred To Asc For Closure Text (Leave Blank If You Do Not Want): After obtaining clear surgical margins the patient was sent to an ASC for surgical repair.  The patient understands they will receive post-surgical care and follow-up from the ASC physician. Information: Selecting Yes will display possible errors in your note based on the variables you have selected. This validation is only offered as a suggestion for you. PLEASE NOTE THAT THE VALIDATION TEXT WILL BE REMOVED WHEN YOU FINALIZE YOUR NOTE. IF YOU WANT TO FAX A PRELIMINARY NOTE YOU WILL NEED TO TOGGLE THIS TO 'NO' IF YOU DO NOT WANT IT IN YOUR FAXED NOTE. O-Z Plasty Text: The defect edges were debeveled with a #15 scalpel blade.  Given the location of the defect, shape of the defect and the proximity to free margins an O-Z plasty (double transposition flap) was deemed most appropriate.  Using a sterile surgical marker, the appropriate transposition flaps were drawn incorporating the defect and placing the expected incisions within the relaxed skin tension lines where possible.    The area thus outlined was incised deep to adipose tissue with a #15 scalpel blade.  The skin margins were undermined to an appropriate distance in all directions utilizing iris scissors.  Hemostasis was achieved with electrocautery.  The flaps were then transposed into place, one clockwise and the other counterclockwise, and anchored with interrupted buried subcutaneous sutures. Rhombic Flap Text: The defect edges were debeveled with a #15 scalpel blade.  Given the location of the defect and the proximity to free margins a rhombic flap was deemed most appropriate.  Using a sterile surgical marker, an appropriate rhombic flap was drawn incorporating the defect.    The area thus outlined was incised deep to adipose tissue with a #15 scalpel blade.  The skin margins were undermined to an appropriate distance in all directions utilizing iris scissors. Split-Thickness Skin Graft Text: The defect edges were debeveled with a #15 scalpel blade.  Given the location of the defect, shape of the defect and the proximity to free margins a split thickness skin graft was deemed most appropriate.  Using a sterile surgical marker, the primary defect shape was transferred to the donor site. The split thickness graft was then harvested.  The skin graft was then placed in the primary defect and oriented appropriately. Additional Anesthesia Volume In Cc: 6 Closure 2 Information: This tab is for additional flaps and grafts, including complex repair and grafts and complex repair and flaps. You can also specify a different location for the additional defect, if the location is the same you do not need to select a new one. We will insert the automated text for the repair you select below just as we do for solitary flaps and grafts. Please note that at this time if you select a location with a different insurance zone you will need to override the ICD10 and CPT if appropriate. Double O-Z Plasty Text: The defect edges were debeveled with a #15 scalpel blade.  Given the location of the defect, shape of the defect and the proximity to free margins a Double O-Z plasty (double transposition flap) was deemed most appropriate.  Using a sterile surgical marker, the appropriate transposition flaps were drawn incorporating the defect and placing the expected incisions within the relaxed skin tension lines where possible. The area thus outlined was incised deep to adipose tissue with a #15 scalpel blade.  The skin margins were undermined to an appropriate distance in all directions utilizing iris scissors.  Hemostasis was achieved with electrocautery.  The flaps were then transposed into place, one clockwise and the other counterclockwise, and anchored with interrupted buried subcutaneous sutures. Melolabial Transposition Flap Text: The defect edges were debeveled with a #15 scalpel blade.  Given the location of the defect and the proximity to free margins a melolabial flap was deemed most appropriate.  Using a sterile surgical marker, an appropriate melolabial transposition flap was drawn incorporating the defect.    The area thus outlined was incised deep to adipose tissue with a #15 scalpel blade.  The skin margins were undermined to an appropriate distance in all directions utilizing iris scissors. Cartilage Graft Text: The defect edges were debeveled with a #15 scalpel blade.  Given the location of the defect, shape of the defect, the fact the defect involved a full thickness cartilage defect a cartilage graft was deemed most appropriate.  An appropriate donor site was identified, cleansed, and anesthetized. The cartilage graft was then harvested and transferred to the recipient site, oriented appropriately and then sutured into place.  The secondary defect was then repaired using a primary closure. Referred To Mid-Level For Closure Text (Leave Blank If You Do Not Want): After obtaining clear surgical margins the patient was sent to a mid-level provider for surgical repair.  The patient understands they will receive post-surgical care and follow-up from the mid-level provider. Dressing: xeroform gauze Hemostasis: Electrocautery Complex Repair Preamble Text (Leave Blank If You Do Not Want): Extensive wide undermining was performed. S Plasty Text: Given the location and shape of the defect, and the orientation of relaxed skin tension lines, an S-plasty was deemed most appropriate for repair.  Using a sterile surgical marker, the appropriate outline of the S-plasty was drawn, incorporating the defect and placing the expected incisions within the relaxed skin tension lines where possible.  The area thus outlined was incised deep to adipose tissue with a #15 scalpel blade.  The skin margins were undermined to an appropriate distance in all directions utilizing iris scissors. The skin flaps were advanced over the defect.  The opposing margins were then approximated with interrupted buried subcutaneous sutures. Composite Graft Text: The defect edges were debeveled with a #15 scalpel blade.  Given the location of the defect, shape of the defect, the proximity to free margins and the fact the defect was full thickness a composite graft was deemed most appropriate.  The defect was outline and then transferred to the donor site.  A full thickness graft was then excised from the donor site. The graft was then placed in the primary defect, oriented appropriately and then sutured into place.  The secondary defect was then repaired using a primary closure. Repair Performed By Another Provider Text (Leave Blank If You Do Not Want): After obtaining clear surgical margins the defect was repaired by another provider. Graft Type: Full Thickness Skin Graft Muscle Hinge Flap Text: The defect edges were debeveled with a #15 scalpel blade.  Given the size, depth and location of the defect and the proximity to free margins a muscle hinge flap was deemed most appropriate.  Using a sterile surgical marker, an appropriate hinge flap was drawn incorporating the defect. The area thus outlined was incised with a #15 scalpel blade.  The skin margins were undermined to an appropriate distance in all directions utilizing iris scissors. Advancement Flap (Single) Text: The defect edges were debeveled with a #15 scalpel blade.  Given the location of the defect and the proximity to free margins a single advancement flap was deemed most appropriate.  Using a sterile surgical marker, an appropriate advancement flap was drawn incorporating the defect and placing the expected incisions within the relaxed skin tension lines where possible.    The area thus outlined was incised deep to adipose tissue with a #15 scalpel blade.  The skin margins were undermined to an appropriate distance in all directions utilizing iris scissors. V-Y Plasty Text: The defect edges were debeveled with a #15 scalpel blade.  Given the location of the defect, shape of the defect and the proximity to free margins an V-Y advancement flap was deemed most appropriate.  Using a sterile surgical marker, an appropriate advancement flap was drawn incorporating the defect and placing the expected incisions within the relaxed skin tension lines where possible.    The area thus outlined was incised deep to adipose tissue with a #15 scalpel blade.  The skin margins were undermined to an appropriate distance in all directions utilizing iris scissors. Epidermal Autograft Text: The defect edges were debeveled with a #15 scalpel blade.  Given the location of the defect, shape of the defect and the proximity to free margins an epidermal autograft was deemed most appropriate.  Using a sterile surgical marker, the primary defect shape was transferred to the donor site. The epidermal graft was then harvested.  The skin graft was then placed in the primary defect and oriented appropriately. Estimated Blood Loss (Cc): minimal Keystone Flap Text: The defect edges were debeveled with a #15 scalpel blade.  Given the location of the defect, shape of the defect a keystone flap was deemed most appropriate.  Using a sterile surgical marker, an appropriate keystone flap was drawn incorporating the defect, outlining the appropriate donor tissue and placing the expected incisions within the relaxed skin tension lines where possible. The area thus outlined was incised deep to adipose tissue with a #15 scalpel blade.  The skin margins were undermined to an appropriate distance in all directions around the primary defect and laterally outward around the flap utilizing iris scissors. Transposition Flap Text: The defect edges were debeveled with a #15 scalpel blade.  Given the location of the defect and the proximity to free margins a transposition flap was deemed most appropriate.  Using a sterile surgical marker, an appropriate transposition flap was drawn incorporating the defect.    The area thus outlined was incised deep to adipose tissue with a #15 scalpel blade.  The skin margins were undermined to an appropriate distance in all directions utilizing iris scissors. Advancement Flap (Double) Text: The defect edges were debeveled with a #15 scalpel blade.  Given the location of the defect and the proximity to free margins a double advancement flap was deemed most appropriate.  Using a sterile surgical marker, the appropriate advancement flaps were drawn incorporating the defect and placing the expected incisions within the relaxed skin tension lines where possible.    The area thus outlined was incised deep to adipose tissue with a #15 scalpel blade.  The skin margins were undermined to an appropriate distance in all directions utilizing iris scissors. H Plasty Text: Given the location of the defect, shape of the defect and the proximity to free margins a H-plasty was deemed most appropriate for repair.  Using a sterile surgical marker, the appropriate advancement arms of the H-plasty were drawn incorporating the defect and placing the expected incisions within the relaxed skin tension lines where possible. The area thus outlined was incised deep to adipose tissue with a #15 scalpel blade. The skin margins were undermined to an appropriate distance in all directions utilizing iris scissors.  The opposing advancement arms were then advanced into place in opposite direction and anchored with interrupted buried subcutaneous sutures. Dermal Autograft Text: The defect edges were debeveled with a #15 scalpel blade.  Given the location of the defect, shape of the defect and the proximity to free margins a dermal autograft was deemed most appropriate.  Using a sterile surgical marker, the primary defect shape was transferred to the donor site. The area thus outlined was incised deep to adipose tissue with a #15 scalpel blade.  The harvested graft was then trimmed of adipose and epidermal tissue until only dermis was left.  The skin graft was then placed in the primary defect and oriented appropriately. Graft Donor Site: logan wedge Island Pedicle Flap-Requiring Vessel Identification Text: The defect edges were debeveled with a #15 scalpel blade.  Given the location of the defect, shape of the defect and the proximity to free margins an island pedicle advancement flap was deemed most appropriate.  Using a sterile surgical marker, an appropriate advancement flap was drawn, based on the axial vessel mentioned above, incorporating the defect, outlining the appropriate donor tissue and placing the expected incisions within the relaxed skin tension lines where possible.    The area thus outlined was incised deep to adipose tissue with a #15 scalpel blade.  The skin margins were undermined to an appropriate distance in all directions around the primary defect and laterally outward around the island pedicle utilizing iris scissors.  There was minimal undermining beneath the pedicle flap. Star Wedge Flap Text: The defect edges were debeveled with a #15 scalpel blade.  Given the location of the defect, shape of the defect and the proximity to free margins a star wedge flap was deemed most appropriate.  Using a sterile surgical marker, an appropriate rotation flap was drawn incorporating the defect and placing the expected incisions within the relaxed skin tension lines where possible. The area thus outlined was incised deep to adipose tissue with a #15 scalpel blade.  The skin margins were undermined to an appropriate distance in all directions utilizing iris scissors. W Plasty Text: The lesion was extirpated to the level of the fat with a #15 scalpel blade.  Given the location of the defect, shape of the defect and the proximity to free margins a W-plasty was deemed most appropriate for repair.  Using a sterile surgical marker, the appropriate transposition arms of the W-plasty were drawn incorporating the defect and placing the expected incisions within the relaxed skin tension lines where possible.    The area thus outlined was incised deep to adipose tissue with a #15 scalpel blade.  The skin margins were undermined to an appropriate distance in all directions utilizing iris scissors.  The opposing transposition arms were then transposed into place in opposite direction and anchored with interrupted buried subcutaneous sutures. Unna Boot Text: An Unna boot was placed to help immobilize the limb and facilitate more rapid healing. Skin Substitute Text: The defect edges were debeveled with a #15 scalpel blade.  Given the location of the defect, shape of the defect and the proximity to free margins a skin substitute graft was deemed most appropriate.  The graft material was trimmed to fit the size of the defect. The graft was then placed in the primary defect and oriented appropriately. Burow's Advancement Flap Text: The defect edges were debeveled with a #15 scalpel blade.  Given the location of the defect and the proximity to free margins a Burow's advancement flap was deemed most appropriate.  Using a sterile surgical marker, the appropriate advancement flap was drawn incorporating the defect and placing the expected incisions within the relaxed skin tension lines where possible.    The area thus outlined was incised deep to adipose tissue with a #15 scalpel blade.  The skin margins were undermined to an appropriate distance in all directions utilizing iris scissors. Double Island Pedicle Flap Text: The defect edges were debeveled with a #15 scalpel blade.  Given the location of the defect, shape of the defect and the proximity to free margins a double island pedicle advancement flap was deemed most appropriate.  Using a sterile surgical marker, an appropriate advancement flap was drawn incorporating the defect, outlining the appropriate donor tissue and placing the expected incisions within the relaxed skin tension lines where possible.    The area thus outlined was incised deep to adipose tissue with a #15 scalpel blade.  The skin margins were undermined to an appropriate distance in all directions around the primary defect and laterally outward around the island pedicle utilizing iris scissors.  There was minimal undermining beneath the pedicle flap. Primary Defect Length (In Cm): 6.5 Spiral Flap Text: The defect edges were debeveled with a #15 scalpel blade.  Given the location of the defect, shape of the defect and the proximity to free margins a spiral flap was deemed most appropriate.  Using a sterile surgical marker, an appropriate rotation flap was drawn incorporating the defect and placing the expected incisions within the relaxed skin tension lines where possible. The area thus outlined was incised deep to adipose tissue with a #15 scalpel blade.  The skin margins were undermined to an appropriate distance in all directions utilizing iris scissors. Anesthesia Volume In Cc: 20 Chonodrocutaneous Helical Advancement Flap Text: The defect edges were debeveled with a #15 scalpel blade.  Given the location of the defect and the proximity to free margins a chondrocutaneous helical advancement flap was deemed most appropriate.  Using a sterile surgical marker, the appropriate advancement flap was drawn incorporating the defect and placing the expected incisions within the relaxed skin tension lines where possible.    The area thus outlined was incised deep to adipose tissue with a #15 scalpel blade.  The skin margins were undermined to an appropriate distance in all directions utilizing iris scissors. Z Plasty Text: The lesion was extirpated to the level of the fat with a #15 scalpel blade.  Given the location of the defect, shape of the defect and the proximity to free margins a Z-plasty was deemed most appropriate for repair.  Using a sterile surgical marker, the appropriate transposition arms of the Z-plasty were drawn incorporating the defect and placing the expected incisions within the relaxed skin tension lines where possible.    The area thus outlined was incised deep to adipose tissue with a #15 scalpel blade.  The skin margins were undermined to an appropriate distance in all directions utilizing iris scissors.  The opposing transposition arms were then transposed into place in opposite direction and anchored with interrupted buried subcutaneous sutures. Skin Substitute Paste Text: The defect edges were debeveled with a #15 scalpel blade.  Given the location of the defect, shape of the defect and the proximity to free margins a skin substitute micronized graft was deemed most appropriate.  The entire vial contents were admixed with 0.5ccs of sterile saline, formed into a paste and then evenly spread over the entire wound bed. Suturegard Intro: Intraoperative tissue expansion was performed, utilizing the SUTUREGARD device, in order to reduce wound tension. Primary Defect Width (In Cm): 4.6 Alar Island Pedicle Flap Text: The defect edges were debeveled with a #15 scalpel blade.  Given the location of the defect, shape of the defect and the proximity to the alar rim an island pedicle advancement flap was deemed most appropriate.  Using a sterile surgical marker, an appropriate advancement flap was drawn incorporating the defect, outlining the appropriate donor tissue and placing the expected incisions within the nasal ala running parallel to the alar rim. The area thus outlined was incised with a #15 scalpel blade.  The skin margins were undermined minimally to an appropriate distance in all directions around the primary defect and laterally outward around the island pedicle utilizing iris scissors.  There was minimal undermining beneath the pedicle flap. Rotation Flap Text: The defect edges were debeveled with a #15 scalpel blade.  Given the location of the defect, shape of the defect and the proximity to free margins a rotation flap was deemed most appropriate.  Using a sterile surgical marker, an appropriate rotation flap was drawn incorporating the defect and placing the expected incisions within the relaxed skin tension lines where possible.    The area thus outlined was incised deep to adipose tissue with a #15 scalpel blade.  The skin margins were undermined to an appropriate distance in all directions utilizing iris scissors. Repair Type: Graft Crescentic Advancement Flap Text: The defect edges were debeveled with a #15 scalpel blade.  Given the location of the defect and the proximity to free margins a crescentic advancement flap was deemed most appropriate.  Using a sterile surgical marker, the appropriate advancement flap was drawn incorporating the defect and placing the expected incisions within the relaxed skin tension lines where possible.    The area thus outlined was incised deep to adipose tissue with a #15 scalpel blade.  The skin margins were undermined to an appropriate distance in all directions utilizing iris scissors. Cheek Interpolation Flap Division And Inset Text: Division and inset of the cheek interpolation flap was performed to achieve optimal aesthetic result, restore normal anatomic appearance and avoid distortion of normal anatomy, expedite and facilitate wound healing, achieve optimal functional result and because linear closure either not possible or would produce suboptimal result. The patient was prepped and draped in the usual manner. The pedicle was infiltrated with local anesthesia. The pedicle was sectioned with a #15 blade. The pedicle was de-bulked and trimmed to match the shape of the defect. Hemostasis was achieved. The flap donor site and free margin of the flap were secured with deep buried sutures and the wound edges were re-approximated. Cheek Interpolation Flap Text: A decision was made to reconstruct the defect utilizing an interpolation axial flap and a staged reconstruction.  A telfa template was made of the defect.  This telfa template was then used to outline the Cheek Interpolation flap.  The donor area for the pedicle flap was then injected with anesthesia.  The flap was excised through the skin and subcutaneous tissue down to the layer of the underlying musculature.  The interpolation flap was carefully excised within this deep plane to maintain its blood supply.  The edges of the donor site were undermined.   The donor site was closed in a primary fashion.  The pedicle was then rotated into position and sutured.  Once the tube was sutured into place, adequate blood supply was confirmed with blanching and refill.  The pedicle was then wrapped with xeroform gauze and dressed appropriately with a telfa and gauze bandage to ensure continued blood supply and protect the attached pedicle. Skin Substitute Injection Text: The defect edges were debeveled with a #15 scalpel blade.  Given the location of the defect, shape of the defect and the proximity to free margins a skin substitute micronized graft was deemed most appropriate.  The entire vial contents were admixed with 3.0ccs of sterile saline and then injected subcutaneously throughout the entire wound bed. Suturegard Body: The suture ends were repeatedly re-tightened and re-clamped to achieve the desired tissue expansion. Island Pedicle Flap With Canthal Suspension Text: The defect edges were debeveled with a #15 scalpel blade.  Given the location of the defect, shape of the defect and the proximity to free margins an island pedicle advancement flap was deemed most appropriate.  Using a sterile surgical marker, an appropriate advancement flap was drawn incorporating the defect, outlining the appropriate donor tissue and placing the expected incisions within the relaxed skin tension lines where possible. The area thus outlined was incised deep to adipose tissue with a #15 scalpel blade.  The skin margins were undermined to an appropriate distance in all directions around the primary defect and laterally outward around the island pedicle utilizing iris scissors.  There was minimal undermining beneath the pedicle flap. A suspension suture was placed in the canthal tendon to prevent tension and prevent ectropion. Detail Level: Detailed Cheek To Nose Interpolation Flap Division And Inset Text: Division and inset of the cheek to nose interpolation flap was performed to achieve optimal aesthetic result, restore normal anatomic appearance and avoid distortion of normal anatomy, expedite and facilitate wound healing, achieve optimal functional result and because linear closure either not possible or would produce suboptimal result. The patient was prepped and draped in the usual manner. The pedicle was infiltrated with local anesthesia. The pedicle was sectioned with a #15 blade. The pedicle was de-bulked and trimmed to match the shape of the defect. Hemostasis was achieved. The flap donor site and free margin of the flap were secured with deep buried sutures and the wound edges were re-approximated. Cheek-To-Nose Interpolation Flap Text: A decision was made to reconstruct the defect utilizing an interpolation axial flap and a staged reconstruction.  A telfa template was made of the defect.  This telfa template was then used to outline the Cheek-To-Nose Interpolation flap.  The donor area for the pedicle flap was then injected with anesthesia.  The flap was excised through the skin and subcutaneous tissue down to the layer of the underlying musculature.  The interpolation flap was carefully excised within this deep plane to maintain its blood supply.  The edges of the donor site were undermined.   The donor site was closed in a primary fashion.  The pedicle was then rotated into position and sutured.  Once the tube was sutured into place, adequate blood supply was confirmed with blanching and refill.  The pedicle was then wrapped with xeroform gauze and dressed appropriately with a telfa and gauze bandage to ensure continued blood supply and protect the attached pedicle. A-T Advancement Flap Text: The defect edges were debeveled with a #15 scalpel blade.  Given the location of the defect, shape of the defect and the proximity to free margins an A-T advancement flap was deemed most appropriate.  Using a sterile surgical marker, an appropriate advancement flap was drawn incorporating the defect and placing the expected incisions within the relaxed skin tension lines where possible.    The area thus outlined was incised deep to adipose tissue with a #15 scalpel blade.  The skin margins were undermined to an appropriate distance in all directions utilizing iris scissors. Tissue Cultured Epidermal Autograft Text: The defect edges were debeveled with a #15 scalpel blade.  Given the location of the defect, shape of the defect and the proximity to free margins a tissue cultured epidermal autograft was deemed most appropriate.  The graft was then trimmed to fit the size of the defect.  The graft was then placed in the primary defect and oriented appropriately. Island Pedicle Flap Text: The defect edges were debeveled with a #15 scalpel blade.  Given the location of the defect, shape of the defect and the proximity to free margins an island pedicle advancement flap was deemed most appropriate.  Using a sterile surgical marker, an appropriate advancement flap was drawn incorporating the defect, outlining the appropriate donor tissue and placing the expected incisions within the relaxed skin tension lines where possible.    The area thus outlined was incised deep to adipose tissue with a #15 scalpel blade.  The skin margins were undermined to an appropriate distance in all directions around the primary defect and laterally outward around the island pedicle utilizing iris scissors.  There was minimal undermining beneath the pedicle flap. Epidermal Sutures: 6-0 Prolene Melolabial Interpolation Flap Division And Inset Text: Division and inset of the melolabial interpolation flap was performed to achieve optimal aesthetic result, restore normal anatomic appearance and avoid distortion of normal anatomy, expedite and facilitate wound healing, achieve optimal functional result and because linear closure either not possible or would produce suboptimal result. The patient was prepped and draped in the usual manner. The pedicle was infiltrated with local anesthesia. The pedicle was sectioned with a #15 blade. The pedicle was de-bulked and trimmed to match the shape of the defect. Hemostasis was achieved. The flap donor site and free margin of the flap were secured with deep buried sutures and the wound edges were re-approximated. O-T Advancement Flap Text: The defect edges were debeveled with a #15 scalpel blade.  Given the location of the defect, shape of the defect and the proximity to free margins an O-T advancement flap was deemed most appropriate.  Using a sterile surgical marker, an appropriate advancement flap was drawn incorporating the defect and placing the expected incisions within the relaxed skin tension lines where possible.    The area thus outlined was incised deep to adipose tissue with a #15 scalpel blade.  The skin margins were undermined to an appropriate distance in all directions utilizing iris scissors. Interpolation Flap Text: A decision was made to reconstruct the defect utilizing an interpolation axial flap and a staged reconstruction.  A telfa template was made of the defect.  This telfa template was then used to outline the interpolation flap.  The donor area for the pedicle flap was then injected with anesthesia.  The flap was excised through the skin and subcutaneous tissue down to the layer of the underlying musculature.  The interpolation flap was carefully excised within this deep plane to maintain its blood supply.  The edges of the donor site were undermined.   The donor site was closed in a primary fashion.  The pedicle was then rotated into position and sutured.  Once the tube was sutured into place, adequate blood supply was confirmed with blanching and refill.  The pedicle was then wrapped with xeroform gauze and dressed appropriately with a telfa and gauze bandage to ensure continued blood supply and protect the attached pedicle. Xenograft Text: The defect edges were debeveled with a #15 scalpel blade.  Given the location of the defect, shape of the defect and the proximity to free margins a xenograft was deemed most appropriate.  The graft was then trimmed to fit the size of the defect.  The graft was then placed in the primary defect and oriented appropriately. Dorsal Nasal Flap Text: The defect edges were debeveled with a #15 scalpel blade.  Given the location of the defect and the proximity to free margins a dorsal nasal flap was deemed most appropriate.  Using a sterile surgical marker, an appropriate dorsal nasal flap was drawn around the defect.    The area thus outlined was incised deep to adipose tissue with a #15 scalpel blade.  The skin margins were undermined to an appropriate distance in all directions utilizing iris scissors. Mastoid Interpolation Flap Division And Inset Text: Division and inset of the mastoid interpolation flap was performed to achieve optimal aesthetic result, restore normal anatomic appearance and avoid distortion of normal anatomy, expedite and facilitate wound healing, achieve optimal functional result and because linear closure either not possible or would produce suboptimal result. The patient was prepped and draped in the usual manner. The pedicle was infiltrated with local anesthesia. The pedicle was sectioned with a #15 blade. The pedicle was de-bulked and trimmed to match the shape of the defect. Hemostasis was achieved. The flap donor site and free margin of the flap were secured with deep buried sutures and the wound edges were re-approximated. O-L Flap Text: The defect edges were debeveled with a #15 scalpel blade.  Given the location of the defect, shape of the defect and the proximity to free margins an O-L flap was deemed most appropriate.  Using a sterile surgical marker, an appropriate advancement flap was drawn incorporating the defect and placing the expected incisions within the relaxed skin tension lines where possible.    The area thus outlined was incised deep to adipose tissue with a #15 scalpel blade.  The skin margins were undermined to an appropriate distance in all directions utilizing iris scissors. Melolabial Interpolation Flap Text: A decision was made to reconstruct the defect utilizing an interpolation axial flap and a staged reconstruction.  A telfa template was made of the defect.  This telfa template was then used to outline the melolabial interpolation flap.  The donor area for the pedicle flap was then injected with anesthesia.  The flap was excised through the skin and subcutaneous tissue down to the layer of the underlying musculature.  The pedicle flap was carefully excised within this deep plane to maintain its blood supply.  The edges of the donor site were undermined.   The donor site was closed in a primary fashion.  The pedicle was then rotated into position and sutured.  Once the tube was sutured into place, adequate blood supply was confirmed with blanching and refill.  The pedicle was then wrapped with xeroform gauze and dressed appropriately with a telfa and gauze bandage to ensure continued blood supply and protect the attached pedicle. Purse String (Simple) Text: Given the location of the defect and the characteristics of the surrounding skin a purse string closure was deemed most appropriate.  Undermining was performed circumfirentially around the surgical defect.  A purse string suture was then placed and tightened.

## 2022-01-26 NOTE — ASU PREOP CHECKLIST - ISOLATION PRECAUTIONS
Group Therapy Note     Date: 7/30/2019  Start Time: 8:30 AM  End Time: 9:45 AM  Number of Participants: 10     Type of Group: Psychotherapy     Wellness Binder Information  Module Name:  n/a  Session Number:  n/a     Patient's Goal: To increase socialization and improve interpersonal relationships. Notes: Themes of group focused on processed feelings related to stressors. Pt engaged in group easily. Pt acknowledged ongoing stressors although reported how he has been coping with stressors. Pt stated that he has noticed improvement in his mood. Pt processed what it felt like for him to acknowledge himself making progress. Pt was able to relate to others and provided supportive feedback. Status After Intervention:  Improved    Participation Level:  Active Listener and Interactive    Participation Quality: Appropriate, Attentive, Sharing and Supportive      Speech:  normal      Thought Process/Content: Logical  Linear      Affective Functioning: Congruent      Mood: euthymic      Level of consciousness:  Alert, Oriented x4 and Attentive      Response to Learning: Able to verbalize current knowledge/experience, Able to verbalize/acknowledge new learning and Able to retain information      Endings: None Reported    Modes of Intervention: Support, Socialization and Exploration      Discipline Responsible: /Counselor      Signature:  Davi Koenig none

## 2022-01-28 PROBLEM — D16.9 BENIGN NEOPLASM OF BONE AND ARTICULAR CARTILAGE, UNSPECIFIED: Chronic | Status: ACTIVE | Noted: 2022-01-12

## 2022-01-28 PROBLEM — M19.90 UNSPECIFIED OSTEOARTHRITIS, UNSPECIFIED SITE: Chronic | Status: ACTIVE | Noted: 2022-01-12

## 2022-01-28 PROBLEM — H40.9 UNSPECIFIED GLAUCOMA: Chronic | Status: ACTIVE | Noted: 2022-01-12

## 2022-01-28 PROBLEM — L93.0 DISCOID LUPUS ERYTHEMATOSUS: Chronic | Status: ACTIVE | Noted: 2022-01-12

## 2022-01-31 LAB — SURGICAL PATHOLOGY STUDY: SIGNIFICANT CHANGE UP

## 2022-02-03 ENCOUNTER — APPOINTMENT (OUTPATIENT)
Dept: NEUROSURGERY | Facility: CLINIC | Age: 62
End: 2022-02-03
Payer: MEDICAID

## 2022-02-03 VITALS
HEART RATE: 81 BPM | HEIGHT: 57 IN | BODY MASS INDEX: 28.91 KG/M2 | OXYGEN SATURATION: 99 % | SYSTOLIC BLOOD PRESSURE: 133 MMHG | DIASTOLIC BLOOD PRESSURE: 79 MMHG | WEIGHT: 134 LBS

## 2022-02-03 DIAGNOSIS — D16.9 BENIGN NEOPLASM OF BONE AND ARTICULAR CARTILAGE, UNSPECIFIED: ICD-10-CM

## 2022-02-03 PROCEDURE — 99024 POSTOP FOLLOW-UP VISIT: CPT

## 2022-02-09 NOTE — HISTORY OF PRESENT ILLNESS
[FreeTextEntry1] : Mrs. 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 The pt is very happy with her results. She has no complaints at this time. The pt stated she will be traveling to [facility] next week for 3 weeks. \par

## 2022-02-09 NOTE — PHYSICAL EXAM
[General Appearance - Alert] : alert [General Appearance - In No Acute Distress] : in no acute distress [Transverse] : transverse [Clean] : clean [Dry] : dry [Healing Well] : healing well [Intact] : intact [Staple Intact] : closed with intact staples [No Drainage] : without drainage [Normal Skin] : normal [Oriented To Time, Place, And Person] : oriented to person, place, and time [Impaired Insight] : insight and judgment were intact [Affect] : the affect was normal [Person] : oriented to person [Place] : oriented to place [Time] : oriented to time [Short Term Intact] : short term memory intact [Remote Intact] : remote memory intact [Span Intact] : the attention span was normal [Concentration Intact] : normal concentrating ability [Fluency] : fluency intact [Comprehension] : comprehension intact [Current Events] : adequate knowledge of current events [Past History] : adequate knowledge of personal past history [Vocabulary] : adequate range of vocabulary [Cranial Nerves Optic (II)] : visual acuity intact bilaterally,  pupils equal round and reactive to light [Cranial Nerves Oculomotor (III)] : extraocular motion intact [Cranial Nerves Trigeminal (V)] : facial sensation intact symmetrically [Cranial Nerves Facial (VII)] : face symmetrical [Cranial Nerves Vestibulocochlear (VIII)] : hearing was intact bilaterally [Cranial Nerves Glossopharyngeal (IX)] : tongue and palate midline [Cranial Nerves Accessory (XI - Cranial And Spinal)] : head turning and shoulder shrug symmetric [Cranial Nerves Hypoglossal (XII)] : there was no tongue deviation with protrusion [Motor Tone] : muscle tone was normal in all four extremities [Motor Strength] : muscle strength was normal in all four extremities [No Muscle Atrophy] : normal bulk in all four extremities [Sensation Tactile Decrease] : light touch was intact [Abnormal Walk] : normal gait [Balance] : balance was intact [2+] : Patella left 2+ [Erythema] : not erythematous [Tender] : not tender [Indurated] : not indurated [Past-pointing] : there was no past-pointing [Tremor] : no tremor present

## 2022-02-09 NOTE — ASSESSMENT
[FreeTextEntry1] : The patient is a 60 y/o, female, post op excision of osteoma 01/26/22. Pt healing very well and happy with results. Staples removed, incision site intact with no s/s of infection\par - Pt can dye hair in 3 weeks\par - Pt cleared to travel to [facility]\par - All questions and concerns addressed.

## 2022-04-06 NOTE — BRIEF OPERATIVE NOTE - NSICDXBRIEFOPLAUNCH_GEN_ALL_CORE
She should try 1 medication at a time. Try Imitrex. Call if not helping. She was prescribed Fioricet by ER last week which is similar to Excedrin and she stated it did not help her headache. <--- Click to Launch ICDx for PreOp, PostOp and Procedure

## 2023-04-26 NOTE — ASU PATIENT PROFILE, ADULT - FALL HARM RISK - UNIVERSAL INTERVENTIONS
"Assessment/Plan:      Diagnoses and all orders for this visit:    Rash and nonspecific skin eruption  -     Ambulatory Referral to Pediatric Dermatology; Future          18 month old male here with c/o rash  Has been present for several weeks, treated two weeks ago with hydrocortisone cream with no significant improvement  Rash was previously flesh-colored, now more pink in color  No change in size  Has not spread  He is otherwise well appearing  Rash does not appear to bother him; he does not scratch it  Given failed trial of low dose topical steroid and non-specific appearance, will reach out to dermatology for another additional suggestions on management  Will also place dermatology referral      Subjective:     Patient ID: Yas Schultz is a 25 m o  male  Accompanied by mother  Was seen x 2 weeks ago for rash on the medial aspect right knee  Was prescribed hydrocortisone 2 5% cream  Mom reports no improvement  Has become more pink in color  Denies any change in size  Has not spread  Does not involve palms or soles  Does not itch or bother him  He doesn't scratch at it  Review of Systems  - see HPI    The following portions of the patient's history were reviewed and updated as appropriate: allergies, current medications, past family history, past medical history, past social history, past surgical history and problem list     Objective:    Vitals:    04/26/23 1602   Temp: 97 6 °F (36 4 °C)   TempSrc: Tympanic   Weight: 13 2 kg (29 lb 3 2 oz)   Height: 34 25\" (87 cm)         Physical Exam  Vitals and nursing note reviewed  Constitutional:       Appearance: Normal appearance  He is well-developed  HENT:      Head: Normocephalic and atraumatic  Eyes:      Extraocular Movements: Extraocular movements intact  Conjunctiva/sclera: Conjunctivae normal       Pupils: Pupils are equal, round, and reactive to light  Cardiovascular:      Heart sounds: Normal heart sounds  No murmur heard      No " friction rub  No gallop  Pulmonary:      Effort: Pulmonary effort is normal       Breath sounds: Normal breath sounds  No wheezing, rhonchi or rales  Skin:     General: Skin is warm  Comments: Pink, papular rash on medial aspect of right knee  No vesicles  No excoriations  No involvement of palms/soles  Neurological:      Mental Status: He is alert  Bed in lowest position, wheels locked, appropriate side rails in place/Call bell, personal items and telephone in reach/Instruct patient to call for assistance before getting out of bed or chair/Non-slip footwear when patient is out of bed/[place] to call system/Physically safe environment - no spills, clutter or unnecessary equipment/Purposeful Proactive Rounding/Room/bathroom lighting operational, light cord in reach

## 2024-05-19 ENCOUNTER — INPATIENT (INPATIENT)
Facility: HOSPITAL | Age: 64
LOS: 2 days | Discharge: HOME | DRG: 392 | End: 2024-05-22
Attending: INTERNAL MEDICINE | Admitting: INTERNAL MEDICINE
Payer: COMMERCIAL

## 2024-05-19 VITALS
HEART RATE: 98 BPM | SYSTOLIC BLOOD PRESSURE: 126 MMHG | DIASTOLIC BLOOD PRESSURE: 61 MMHG | OXYGEN SATURATION: 96 % | RESPIRATION RATE: 16 BRPM | HEIGHT: 57 IN | WEIGHT: 119.93 LBS | TEMPERATURE: 98 F

## 2024-05-19 DIAGNOSIS — Z98.84 BARIATRIC SURGERY STATUS: Chronic | ICD-10-CM

## 2024-05-19 DIAGNOSIS — Z90.710 ACQUIRED ABSENCE OF BOTH CERVIX AND UTERUS: Chronic | ICD-10-CM

## 2024-05-19 DIAGNOSIS — Z98.891 HISTORY OF UTERINE SCAR FROM PREVIOUS SURGERY: Chronic | ICD-10-CM

## 2024-05-19 DIAGNOSIS — Z96.651 PRESENCE OF RIGHT ARTIFICIAL KNEE JOINT: Chronic | ICD-10-CM

## 2024-05-19 LAB
ALBUMIN SERPL ELPH-MCNC: 4 G/DL — SIGNIFICANT CHANGE UP (ref 3.5–5)
ALP SERPL-CCNC: 108 U/L — SIGNIFICANT CHANGE UP (ref 40–120)
ALT FLD-CCNC: 44 U/L DA — SIGNIFICANT CHANGE UP (ref 10–60)
ANION GAP SERPL CALC-SCNC: 7 MMOL/L — SIGNIFICANT CHANGE UP (ref 5–17)
APPEARANCE UR: CLEAR — SIGNIFICANT CHANGE UP
AST SERPL-CCNC: 39 U/L — SIGNIFICANT CHANGE UP (ref 10–40)
BASOPHILS # BLD AUTO: 0.05 K/UL — SIGNIFICANT CHANGE UP (ref 0–0.2)
BASOPHILS NFR BLD AUTO: 0.3 % — SIGNIFICANT CHANGE UP (ref 0–2)
BILIRUB SERPL-MCNC: 0.5 MG/DL — SIGNIFICANT CHANGE UP (ref 0.2–1.2)
BILIRUB UR-MCNC: NEGATIVE — SIGNIFICANT CHANGE UP
BUN SERPL-MCNC: 22 MG/DL — HIGH (ref 7–18)
CALCIUM SERPL-MCNC: 9.1 MG/DL — SIGNIFICANT CHANGE UP (ref 8.4–10.5)
CHLORIDE SERPL-SCNC: 113 MMOL/L — HIGH (ref 96–108)
CO2 SERPL-SCNC: 24 MMOL/L — SIGNIFICANT CHANGE UP (ref 22–31)
COLOR SPEC: YELLOW — SIGNIFICANT CHANGE UP
CREAT SERPL-MCNC: 0.7 MG/DL — SIGNIFICANT CHANGE UP (ref 0.5–1.3)
DIFF PNL FLD: NEGATIVE — SIGNIFICANT CHANGE UP
EGFR: 95 ML/MIN/1.73M2 — SIGNIFICANT CHANGE UP
EOSINOPHIL # BLD AUTO: 0.04 K/UL — SIGNIFICANT CHANGE UP (ref 0–0.5)
EOSINOPHIL NFR BLD AUTO: 0.3 % — SIGNIFICANT CHANGE UP (ref 0–6)
GLUCOSE SERPL-MCNC: 136 MG/DL — HIGH (ref 70–99)
GLUCOSE UR QL: NEGATIVE MG/DL — SIGNIFICANT CHANGE UP
HCG UR QL: NEGATIVE — SIGNIFICANT CHANGE UP
HCT VFR BLD CALC: 42.9 % — SIGNIFICANT CHANGE UP (ref 34.5–45)
HGB BLD-MCNC: 13.6 G/DL — SIGNIFICANT CHANGE UP (ref 11.5–15.5)
IMM GRANULOCYTES NFR BLD AUTO: 0.7 % — SIGNIFICANT CHANGE UP (ref 0–0.9)
KETONES UR-MCNC: ABNORMAL MG/DL
LACTATE SERPL-SCNC: 2.9 MMOL/L — HIGH (ref 0.7–2)
LEUKOCYTE ESTERASE UR-ACNC: NEGATIVE — SIGNIFICANT CHANGE UP
LIDOCAIN IGE QN: 63 U/L — SIGNIFICANT CHANGE UP (ref 13–75)
LYMPHOCYTES # BLD AUTO: 0.7 K/UL — LOW (ref 1–3.3)
LYMPHOCYTES # BLD AUTO: 4.5 % — LOW (ref 13–44)
MCHC RBC-ENTMCNC: 29.7 PG — SIGNIFICANT CHANGE UP (ref 27–34)
MCHC RBC-ENTMCNC: 31.7 GM/DL — LOW (ref 32–36)
MCV RBC AUTO: 93.7 FL — SIGNIFICANT CHANGE UP (ref 80–100)
MONOCYTES # BLD AUTO: 0.26 K/UL — SIGNIFICANT CHANGE UP (ref 0–0.9)
MONOCYTES NFR BLD AUTO: 1.7 % — LOW (ref 2–14)
NEUTROPHILS # BLD AUTO: 14.33 K/UL — HIGH (ref 1.8–7.4)
NEUTROPHILS NFR BLD AUTO: 92.5 % — HIGH (ref 43–77)
NITRITE UR-MCNC: NEGATIVE — SIGNIFICANT CHANGE UP
NRBC # BLD: 0 /100 WBCS — SIGNIFICANT CHANGE UP (ref 0–0)
PH UR: 5 — SIGNIFICANT CHANGE UP (ref 5–8)
PLATELET # BLD AUTO: 259 K/UL — SIGNIFICANT CHANGE UP (ref 150–400)
POTASSIUM SERPL-MCNC: 3.7 MMOL/L — SIGNIFICANT CHANGE UP (ref 3.5–5.3)
POTASSIUM SERPL-SCNC: 3.7 MMOL/L — SIGNIFICANT CHANGE UP (ref 3.5–5.3)
PROT SERPL-MCNC: 7.7 G/DL — SIGNIFICANT CHANGE UP (ref 6–8.3)
PROT UR-MCNC: NEGATIVE MG/DL — SIGNIFICANT CHANGE UP
RBC # BLD: 4.58 M/UL — SIGNIFICANT CHANGE UP (ref 3.8–5.2)
RBC # FLD: 13.9 % — SIGNIFICANT CHANGE UP (ref 10.3–14.5)
SODIUM SERPL-SCNC: 144 MMOL/L — SIGNIFICANT CHANGE UP (ref 135–145)
SP GR SPEC: 1.02 — SIGNIFICANT CHANGE UP (ref 1–1.03)
UROBILINOGEN FLD QL: 0.2 MG/DL — SIGNIFICANT CHANGE UP (ref 0.2–1)
WBC # BLD: 15.49 K/UL — HIGH (ref 3.8–10.5)
WBC # FLD AUTO: 15.49 K/UL — HIGH (ref 3.8–10.5)

## 2024-05-19 PROCEDURE — 99285 EMERGENCY DEPT VISIT HI MDM: CPT

## 2024-05-19 RX ORDER — SODIUM CHLORIDE 9 MG/ML
1000 INJECTION INTRAMUSCULAR; INTRAVENOUS; SUBCUTANEOUS ONCE
Refills: 0 | Status: COMPLETED | OUTPATIENT
Start: 2024-05-19 | End: 2024-05-19

## 2024-05-19 RX ORDER — ONDANSETRON 8 MG/1
1 TABLET, FILM COATED ORAL
Qty: 1 | Refills: 0
Start: 2024-05-19 | End: 2024-05-21

## 2024-05-19 RX ORDER — ONDANSETRON 8 MG/1
4 TABLET, FILM COATED ORAL ONCE
Refills: 0 | Status: COMPLETED | OUTPATIENT
Start: 2024-05-19 | End: 2024-05-19

## 2024-05-19 RX ORDER — FAMOTIDINE 10 MG/ML
1 INJECTION INTRAVENOUS
Qty: 14 | Refills: 0
Start: 2024-05-19 | End: 2024-05-25

## 2024-05-19 RX ORDER — FAMOTIDINE 10 MG/ML
20 INJECTION INTRAVENOUS ONCE
Refills: 0 | Status: COMPLETED | OUTPATIENT
Start: 2024-05-19 | End: 2024-05-19

## 2024-05-19 RX ADMIN — SODIUM CHLORIDE 1000 MILLILITER(S): 9 INJECTION INTRAMUSCULAR; INTRAVENOUS; SUBCUTANEOUS at 21:00

## 2024-05-19 RX ADMIN — SODIUM CHLORIDE 1000 MILLILITER(S): 9 INJECTION INTRAMUSCULAR; INTRAVENOUS; SUBCUTANEOUS at 23:22

## 2024-05-19 RX ADMIN — SODIUM CHLORIDE 1000 MILLILITER(S): 9 INJECTION INTRAMUSCULAR; INTRAVENOUS; SUBCUTANEOUS at 20:12

## 2024-05-19 RX ADMIN — FAMOTIDINE 20 MILLIGRAM(S): 10 INJECTION INTRAVENOUS at 20:30

## 2024-05-19 RX ADMIN — ONDANSETRON 4 MILLIGRAM(S): 8 TABLET, FILM COATED ORAL at 20:12

## 2024-05-19 NOTE — ED ADULT NURSE NOTE - NSSEPSISSUSPECTED_ED_A_ED
Page out to [facility] MD in regards to /68 to see if diltiazem  mg should be given.  BP yesterday AM was 116/72 and BP dropped down to 94/55.  MD made aware that there is a hold parameter for systolic BP below 100, but writer wanted to be safe and double check.  HR 75-95 bpm.  Per MD give medication as it meets the parameters.  Will continue to monitor.   Unable to Assess: Patient left before being evaluated

## 2024-05-19 NOTE — ED PROVIDER NOTE - CLINICAL SUMMARY MEDICAL DECISION MAKING FREE TEXT BOX
64-year-old female says that she started with sudden onset of vomiting and diarrhea this afternoon.  No sick contacts.  Complains of mild abdominal pain.  No urinary complaints.    Vital signs no tachycardia.  Patient is in moderate distress.  And trembling.  Mucous membranes are dry.  Neuroexam is nonfocal.  Abdomen is diffusely tender no rebound no rigidity.    Plan: IV fluids, Pepcid, Zofran consider CAT scan.  After treatment patient did feel better.  No more symptoms.  Blood pressure 90/68, heart rate 116 temperature 100 plan.  Another liter of fluids.  Tylenol.  Chest x-ray and reevaluate.

## 2024-05-19 NOTE — ED ADULT NURSE NOTE - NSFALLUNIVINTERV_ED_ALL_ED
Bed/Stretcher in lowest position, wheels locked, appropriate side rails in place/Call bell, personal items and telephone in reach/Instruct patient to call for assistance before getting out of bed/chair/stretcher/Non-slip footwear applied when patient is off stretcher/[place] to call system/Physically safe environment - no spills, clutter or unnecessary equipment/Purposeful proactive rounding/Room/bathroom lighting operational, light cord in reach

## 2024-05-19 NOTE — ED PROVIDER NOTE - CARE PLAN
1 Principal Discharge DX:	Acute gastroenteritis   Principal Discharge DX:	Acute gastroenteritis  Secondary Diagnosis:	Hypotension

## 2024-05-19 NOTE — ED PROVIDER NOTE - PROGRESS NOTE DETAILS
Mario Alberto DELGADO: Received sign out from Dr. Shepard.   Repeat lactate decreased to 1.  BP 93/55 despite 3 L NS bolus.  Will administer 4th liter and reassess. Pt otherwise in no acute distress. Systolic blood pressure remains less than 100.  Patient also noted to spike fever to 100 at 12:02 AM.  MAR endorsed will order blood cultures, urine cultures, Cipro/Flagyl for infectious colitis.  Patient in no acute distress, has no guarding to my abdominal palpation.  Patient agrees with admission.  I had a detailed discussion with the patient and/or guardian regarding the historical points, exam findings, and any diagnostic results supporting the admit diagnosis. Dr. Montiel endorsed.

## 2024-05-19 NOTE — ED PROVIDER NOTE - PATIENT PORTAL LINK FT
You can access the FollowMyHealth Patient Portal offered by [facility] by registering at the following website: [email or web address]. By joining [employer]’s FollowMyHealth portal, you will also be able to view your health information using other applications (apps) compatible with our system.

## 2024-05-20 DIAGNOSIS — Z98.890 OTHER SPECIFIED POSTPROCEDURAL STATES: Chronic | ICD-10-CM

## 2024-05-20 DIAGNOSIS — K52.9 NONINFECTIVE GASTROENTERITIS AND COLITIS, UNSPECIFIED: ICD-10-CM

## 2024-05-20 DIAGNOSIS — M32.9 SYSTEMIC LUPUS ERYTHEMATOSUS, UNSPECIFIED: ICD-10-CM

## 2024-05-20 DIAGNOSIS — Z90.710 ACQUIRED ABSENCE OF BOTH CERVIX AND UTERUS: Chronic | ICD-10-CM

## 2024-05-20 DIAGNOSIS — Z29.9 ENCOUNTER FOR PROPHYLACTIC MEASURES, UNSPECIFIED: ICD-10-CM

## 2024-05-20 DIAGNOSIS — A41.9 SEPSIS, UNSPECIFIED ORGANISM: ICD-10-CM

## 2024-05-20 LAB — LACTATE SERPL-SCNC: 1 MMOL/L — SIGNIFICANT CHANGE UP (ref 0.7–2)

## 2024-05-20 PROCEDURE — 74177 CT ABD & PELVIS W/CONTRAST: CPT | Mod: 26

## 2024-05-20 PROCEDURE — 71045 X-RAY EXAM CHEST 1 VIEW: CPT | Mod: 26

## 2024-05-20 RX ORDER — ONDANSETRON 8 MG/1
4 TABLET, FILM COATED ORAL EVERY 8 HOURS
Refills: 0 | Status: DISCONTINUED | OUTPATIENT
Start: 2024-05-20 | End: 2024-05-22

## 2024-05-20 RX ORDER — METRONIDAZOLE 500 MG
500 TABLET ORAL ONCE
Refills: 0 | Status: COMPLETED | OUTPATIENT
Start: 2024-05-20 | End: 2024-05-20

## 2024-05-20 RX ORDER — TRAMADOL HYDROCHLORIDE 50 MG/1
50 TABLET ORAL THREE TIMES A DAY
Refills: 0 | Status: DISCONTINUED | OUTPATIENT
Start: 2024-05-20 | End: 2024-05-22

## 2024-05-20 RX ORDER — CIPROFLOXACIN LACTATE 400MG/40ML
400 VIAL (ML) INTRAVENOUS ONCE
Refills: 0 | Status: COMPLETED | OUTPATIENT
Start: 2024-05-20 | End: 2024-05-20

## 2024-05-20 RX ORDER — ACETAMINOPHEN 500 MG
650 TABLET ORAL EVERY 6 HOURS
Refills: 0 | Status: DISCONTINUED | OUTPATIENT
Start: 2024-05-20 | End: 2024-05-22

## 2024-05-20 RX ORDER — SODIUM CHLORIDE 9 MG/ML
1000 INJECTION, SOLUTION INTRAVENOUS
Refills: 0 | Status: DISCONTINUED | OUTPATIENT
Start: 2024-05-20 | End: 2024-05-21

## 2024-05-20 RX ORDER — CIPROFLOXACIN LACTATE 400MG/40ML
400 VIAL (ML) INTRAVENOUS EVERY 12 HOURS
Refills: 0 | Status: DISCONTINUED | OUTPATIENT
Start: 2024-05-20 | End: 2024-05-22

## 2024-05-20 RX ORDER — LANOLIN ALCOHOL/MO/W.PET/CERES
3 CREAM (GRAM) TOPICAL AT BEDTIME
Refills: 0 | Status: DISCONTINUED | OUTPATIENT
Start: 2024-05-20 | End: 2024-05-22

## 2024-05-20 RX ORDER — SODIUM CHLORIDE 9 MG/ML
1000 INJECTION INTRAMUSCULAR; INTRAVENOUS; SUBCUTANEOUS ONCE
Refills: 0 | Status: COMPLETED | OUTPATIENT
Start: 2024-05-20 | End: 2024-05-20

## 2024-05-20 RX ORDER — HYDROXYCHLOROQUINE SULFATE 200 MG
200 TABLET ORAL
Refills: 0 | Status: DISCONTINUED | OUTPATIENT
Start: 2024-05-20 | End: 2024-05-22

## 2024-05-20 RX ORDER — ENOXAPARIN SODIUM 100 MG/ML
40 INJECTION SUBCUTANEOUS EVERY 24 HOURS
Refills: 0 | Status: DISCONTINUED | OUTPATIENT
Start: 2024-05-20 | End: 2024-05-22

## 2024-05-20 RX ORDER — ACETAMINOPHEN 500 MG
650 TABLET ORAL ONCE
Refills: 0 | Status: COMPLETED | OUTPATIENT
Start: 2024-05-20 | End: 2024-05-20

## 2024-05-20 RX ORDER — METRONIDAZOLE 500 MG
500 TABLET ORAL EVERY 8 HOURS
Refills: 0 | Status: DISCONTINUED | OUTPATIENT
Start: 2024-05-20 | End: 2024-05-22

## 2024-05-20 RX ORDER — SODIUM CHLORIDE 9 MG/ML
1000 INJECTION INTRAMUSCULAR; INTRAVENOUS; SUBCUTANEOUS
Refills: 0 | Status: DISCONTINUED | OUTPATIENT
Start: 2024-05-20 | End: 2024-05-20

## 2024-05-20 RX ORDER — METRONIDAZOLE 500 MG
TABLET ORAL
Refills: 0 | Status: DISCONTINUED | OUTPATIENT
Start: 2024-05-20 | End: 2024-05-22

## 2024-05-20 RX ORDER — PANTOPRAZOLE SODIUM 20 MG/1
40 TABLET, DELAYED RELEASE ORAL
Refills: 0 | Status: DISCONTINUED | OUTPATIENT
Start: 2024-05-20 | End: 2024-05-22

## 2024-05-20 RX ADMIN — SODIUM CHLORIDE 1000 MILLILITER(S): 9 INJECTION INTRAMUSCULAR; INTRAVENOUS; SUBCUTANEOUS at 02:02

## 2024-05-20 RX ADMIN — Medication 200 MILLIGRAM(S): at 18:37

## 2024-05-20 RX ADMIN — SODIUM CHLORIDE 1000 MILLILITER(S): 9 INJECTION INTRAMUSCULAR; INTRAVENOUS; SUBCUTANEOUS at 01:00

## 2024-05-20 RX ADMIN — SODIUM CHLORIDE 80 MILLILITER(S): 9 INJECTION, SOLUTION INTRAVENOUS at 23:17

## 2024-05-20 RX ADMIN — SODIUM CHLORIDE 1000 MILLILITER(S): 9 INJECTION INTRAMUSCULAR; INTRAVENOUS; SUBCUTANEOUS at 00:16

## 2024-05-20 RX ADMIN — TRAMADOL HYDROCHLORIDE 50 MILLIGRAM(S): 50 TABLET ORAL at 15:50

## 2024-05-20 RX ADMIN — Medication 650 MILLIGRAM(S): at 06:30

## 2024-05-20 RX ADMIN — Medication 100 MILLIGRAM(S): at 13:59

## 2024-05-20 RX ADMIN — Medication 500 MILLIGRAM(S): at 08:35

## 2024-05-20 RX ADMIN — SODIUM CHLORIDE 1000 MILLILITER(S): 9 INJECTION INTRAMUSCULAR; INTRAVENOUS; SUBCUTANEOUS at 00:00

## 2024-05-20 RX ADMIN — Medication 650 MILLIGRAM(S): at 05:59

## 2024-05-20 RX ADMIN — TRAMADOL HYDROCHLORIDE 50 MILLIGRAM(S): 50 TABLET ORAL at 16:00

## 2024-05-20 RX ADMIN — Medication 100 MILLIGRAM(S): at 23:17

## 2024-05-20 RX ADMIN — Medication 650 MILLIGRAM(S): at 00:16

## 2024-05-20 RX ADMIN — SODIUM CHLORIDE 1000 MILLILITER(S): 9 INJECTION INTRAMUSCULAR; INTRAVENOUS; SUBCUTANEOUS at 03:00

## 2024-05-20 RX ADMIN — Medication 200 MILLIGRAM(S): at 06:36

## 2024-05-20 RX ADMIN — Medication 650 MILLIGRAM(S): at 23:17

## 2024-05-20 RX ADMIN — SODIUM CHLORIDE 200 MILLILITER(S): 9 INJECTION INTRAMUSCULAR; INTRAVENOUS; SUBCUTANEOUS at 02:00

## 2024-05-20 RX ADMIN — Medication 650 MILLIGRAM(S): at 00:45

## 2024-05-20 RX ADMIN — Medication 100 MILLIGRAM(S): at 05:58

## 2024-05-20 RX ADMIN — Medication 200 MILLIGRAM(S): at 18:38

## 2024-05-20 RX ADMIN — Medication 650 MILLIGRAM(S): at 23:47

## 2024-05-20 NOTE — H&P ADULT - ASSESSMENT
Patient is a 64 year old female from home, ambulates independently, with PMH of SLE, arthritis, and depression who presented to the ED with nausea, vomiting, and diarrhea for the past 2 days.  In the ED patient BP soft with SBP in 90s despite aggressive IV Fluid hydration with 4L.  Patient saturating well on RA and Temp max of 100F.  patients WBC of 15k and lactate initially elevated to 2.9 which improved with fluids to 1.  patient is being admitted to medicine for Severe sepsis 2/2 likely GI source. Patient is a 64 year old female from home, ambulates independently, with PMH of SLE, arthritis, and depression who presented to the ED with nausea, vomiting, and diarrhea for the past 2 days.  In the ED patient BP soft with SBP in 90s despite aggressive IV Fluid hydration with 4L.  Patient saturating well on RA and Temp max of 100F.  patients WBC of 15k and lactate initially elevated to 2.9 which improved with fluids to 1.  patient is being admitted to medicine for Severe sepsis 2/2 likely GI source.    PATIENT STATES SHE IS ONLY TAKING HYDROXYCHLORQUINE TRAMADOL, AND LEFLUNOMIDE HOWEVER PHARMACY ALSO HAS LISTED Lexipro 10mg, Alendronate, Magnesium 400mg qd, Gabapentin 800mg qd, Amitriptyline 10mg qd.  WILL RESUME ONLY MEDS PATIENT STATS SHE IS ACTIVELY TAKING

## 2024-05-20 NOTE — ED ADULT NURSE REASSESSMENT NOTE - NS ED NURSE REASSESS COMMENT FT1
Pt reports feeling better, IV fluids infusing as ordered.
PT pending admission, IV ABX infusing. ambulated to bathroom with steady gait. aao x3 nad

## 2024-05-20 NOTE — CONSULT NOTE ADULT - SUBJECTIVE AND OBJECTIVE BOX
[  ] STAT REQUEST              [ X ] ROUTINE REQUEST    Patient is a 64 year old female y old  Female who presents with a chief complaint of Severe Sepsis 2/2 likely colitis vs diverticulitis        HPI:  Patient is a 64 year old female from home, ambulates independently, with past medical history significant for SLE, arthritis, and depression who presented to the emergency room with 2 days history of progressively worsening watery non bloody diarrhea associated with nausea, non bloody vomiting and intermittent diffuse 5-6/10 intensity non radiating crampy abdominal pain. Patient denies hematemesis, hematochezia, melena, fever, chills, chest pain, SOB, cough, hematuria, dysuria, recent traveling or antibiotics intake.       PAIN MANAGEMENT:  Pain Scale:                5-6 /10  Pain Location:  Intermittent diffuse crampy abdominal pain       PAST MEDICAL HISTORY    SLE (systemic lupus erythematosus)    Arthritis    Major depression        PAST SURGICAL HISTORY    Hysterectomy    Breast surgery        Allergies    No Known Allergies    Intolerances  None       MEDICATIONS  (STANDING):  ciprofloxacin   IVPB 400 milliGRAM(s) IV Intermittent every 12 hours  enoxaparin Injectable 40 milliGRAM(s) SubCutaneous every 24 hours  lactated ringers. 1000 milliLiter(s) (80 mL/Hr) IV Continuous <Continuous>  metroNIDAZOLE  IVPB      metroNIDAZOLE  IVPB 500 milliGRAM(s) IV Intermittent every 8 hours  sodium chloride 0.9%. 1000 milliLiter(s) (200 mL/Hr) IV Continuous <Continuous>    MEDICATIONS  (PRN):  acetaminophen     Tablet .. 650 milliGRAM(s) Oral every 6 hours PRN Temp greater or equal to 38C (100.4F), Mild Pain (1 - 3)  aluminum hydroxide/magnesium hydroxide/simethicone Suspension 30 milliLiter(s) Oral every 4 hours PRN Dyspepsia  melatonin 3 milliGRAM(s) Oral at bedtime PRN Insomnia  ondansetron Injectable 4 milliGRAM(s) IV Push every 8 hours PRN Nausea and/or Vomiting      SOCIAL HISTORY  Advanced Directives:       [ X ] Full Code       [  ] DNR  Marital Status:         [  ] M      [ X ] S      [  ] D       [  ] W  Children:       [X  ] Yes      [  ] No  Occupation:        [  ] Employed       [ X ] Unemployed       [  ] Retired  Diet:       [X  ] Regular       [  ] PEG feeding          [  ] NG tube feeding  Drug Use:           [ X ] Patient denied          [  ] Yes  Alcohol:           [ X ] No             [  ] Yes (socially)         [  ] Yes (chronic)  Tobacco:           [  ] Yes           [ X ] No      FAMILY HISTORY  [ X ] Heart Disease            [ X ] Diabetes             [ X ] HTN             [  ] Colon Cancer             [  ] Stomach Cancer              [  ] Pancreatic Cancer      VITAL SIGNS   Vital Signs Last 24 Hrs  T(C): 36.8 (05-20-24 @ 07:38), Max: 37.8 (05-20-24 @ 00:02)  T(F): 98.3 (05-20-24 @ 07:38), Max: 100 (05-20-24 @ 00:02)  HR: 90 (05-20-24 @ 07:38) (90 - 116)  BP: 98/61 (05-20-24 @ 07:38) (94/57 - 126/61)   RR: 18 (05-20-24 @ 07:38) (16 - 18)  SpO2: 96% (05-20-24 @ 07:38) (94% - 96%)  Daily Height in cm: 144.78 (19 May 2024 18:55)          CBC Full  -  ( 19 May 2024 20:14 )  WBC Count : 15.49 K/uL  RBC Count : 4.58 M/uL  Hemoglobin : 13.6 g/dL  Hematocrit : 42.9 %  Platelet Count - Automated : 259 K/uL  Mean Cell Volume : 93.7 fl  Mean Cell Hemoglobin : 29.7 pg  Mean Cell Hemoglobin Concentration : 31.7 gm/dL  Auto Neutrophil # : 14.33 K/uL  Auto Lymphocyte # : 0.70 K/uL  Auto Monocyte # : 0.26 K/uL  Auto Eosinophil # : 0.04 K/uL  Auto Basophil # : 0.05 K/uL  Auto Neutrophil % : 92.5 %  Auto Lymphocyte % : 4.5 %  Auto Monocyte % : 1.7 %  Auto Eosinophil % : 0.3 %  Auto Basophil % : 0.3 %      05-19    144  |  113<H>  |  22<H>  ----------------------------<  136<H>  3.7   |  24  |  0.70    Ca    9.1      19 May 2024 20:14    TPro  7.7  /  Alb  4.0  /  TBili  0.5  /  DBili  x   /  AST  39  /  ALT  44  /  AlkPhos  108  05-19      Lipase: 63 U/L (05-19 @ 20:14)     Urinalysis (05.19.24 @ 23:18)   Glucose Qualitative, Urine: Negative mg/dL  Blood, Urine: Negative  pH Urine: 5.0  Color: Yellow  Urine Appearance: Clear  Bilirubin: Negative  Ketone - Urine: Trace mg/dL  Specific Gravity: 1.016  Protein, Urine: Negative mg/dL  Urobilinogen: 0.2 mg/dL  Nitrite: Negative  Leukocyte Esterase Concentration: Negative                                    [  ] STAT REQUEST              [ X ] ROUTINE REQUEST    Patient is a 64 year old female with abdominal pain, diarrhea, nausea and vomiting. GI consulted to evaluate.         HPI:  Patient is a 64 year old female from home, ambulates independently, with past medical history significant for SLE, arthritis, and depression who presented to the emergency room with 2 days history of progressively worsening watery non bloody diarrhea associated with nausea, non bloody vomiting and intermittent diffuse 5-6/10 intensity non radiating crampy abdominal pain. Patient denies hematemesis, hematochezia, melena, fever, chills, chest pain, SOB, cough, hematuria, dysuria, recent traveling or antibiotics intake.       PAIN MANAGEMENT:  Pain Scale:                5-6 /10  Pain Location:  Intermittent diffuse crampy abdominal pain       PAST MEDICAL HISTORY    SLE (systemic lupus erythematosus)    Arthritis    Major depression        PAST SURGICAL HISTORY    Hysterectomy    Breast surgery        Allergies    No Known Allergies    Intolerances  None       MEDICATIONS  (STANDING):  ciprofloxacin   IVPB 400 milliGRAM(s) IV Intermittent every 12 hours  enoxaparin Injectable 40 milliGRAM(s) SubCutaneous every 24 hours  lactated ringers. 1000 milliLiter(s) (80 mL/Hr) IV Continuous <Continuous>  metroNIDAZOLE  IVPB      metroNIDAZOLE  IVPB 500 milliGRAM(s) IV Intermittent every 8 hours  sodium chloride 0.9%. 1000 milliLiter(s) (200 mL/Hr) IV Continuous <Continuous>    MEDICATIONS  (PRN):  acetaminophen     Tablet .. 650 milliGRAM(s) Oral every 6 hours PRN Temp greater or equal to 38C (100.4F), Mild Pain (1 - 3)  aluminum hydroxide/magnesium hydroxide/simethicone Suspension 30 milliLiter(s) Oral every 4 hours PRN Dyspepsia  melatonin 3 milliGRAM(s) Oral at bedtime PRN Insomnia  ondansetron Injectable 4 milliGRAM(s) IV Push every 8 hours PRN Nausea and/or Vomiting      SOCIAL HISTORY  Advanced Directives:       [ X ] Full Code       [  ] DNR  Marital Status:         [  ] M      [ X ] S      [  ] D       [  ] W  Children:       [X  ] Yes      [  ] No  Occupation:        [  ] Employed       [ X ] Unemployed       [  ] Retired  Diet:       [X  ] Regular       [  ] PEG feeding          [  ] NG tube feeding  Drug Use:           [ X ] Patient denied          [  ] Yes  Alcohol:           [ X ] No             [  ] Yes (socially)         [  ] Yes (chronic)  Tobacco:           [  ] Yes           [ X ] No      FAMILY HISTORY  [ X ] Heart Disease            [ X ] Diabetes             [ X ] HTN             [  ] Colon Cancer             [  ] Stomach Cancer              [  ] Pancreatic Cancer      VITAL SIGNS   Vital Signs Last 24 Hrs  T(C): 36.8 (05-20-24 @ 07:38), Max: 37.8 (05-20-24 @ 00:02)  T(F): 98.3 (05-20-24 @ 07:38), Max: 100 (05-20-24 @ 00:02)  HR: 90 (05-20-24 @ 07:38) (90 - 116)  BP: 98/61 (05-20-24 @ 07:38) (94/57 - 126/61)   RR: 18 (05-20-24 @ 07:38) (16 - 18)  SpO2: 96% (05-20-24 @ 07:38) (94% - 96%)  Daily Height in cm: 144.78 (19 May 2024 18:55)          CBC Full  -  ( 19 May 2024 20:14 )  WBC Count : 15.49 K/uL  RBC Count : 4.58 M/uL  Hemoglobin : 13.6 g/dL  Hematocrit : 42.9 %  Platelet Count - Automated : 259 K/uL  Mean Cell Volume : 93.7 fl  Mean Cell Hemoglobin : 29.7 pg  Mean Cell Hemoglobin Concentration : 31.7 gm/dL  Auto Neutrophil # : 14.33 K/uL  Auto Lymphocyte # : 0.70 K/uL  Auto Monocyte # : 0.26 K/uL  Auto Eosinophil # : 0.04 K/uL  Auto Basophil # : 0.05 K/uL  Auto Neutrophil % : 92.5 %  Auto Lymphocyte % : 4.5 %  Auto Monocyte % : 1.7 %  Auto Eosinophil % : 0.3 %  Auto Basophil % : 0.3 %      05-19    144  |  113<H>  |  22<H>  ----------------------------<  136<H>  3.7   |  24  |  0.70    Ca    9.1      19 May 2024 20:14    TPro  7.7  /  Alb  4.0  /  TBili  0.5  /  DBili  x   /  AST  39  /  ALT  44  /  AlkPhos  108  05-19      Lipase: 63 U/L (05-19 @ 20:14)     Urinalysis (05.19.24 @ 23:18)   Glucose Qualitative, Urine: Negative mg/dL  Blood, Urine: Negative  pH Urine: 5.0  Color: Yellow  Urine Appearance: Clear  Bilirubin: Negative  Ketone - Urine: Trace mg/dL  Specific Gravity: 1.016  Protein, Urine: Negative mg/dL  Urobilinogen: 0.2 mg/dL  Nitrite: Negative  Leukocyte Esterase Concentration: Negative

## 2024-05-20 NOTE — H&P ADULT - NSHPPHYSICALEXAM_GEN_ALL_CORE
T(C): 36.8 (05-20-24 @ 07:38), Max: 37.8 (05-20-24 @ 00:02)  T(F): 98.3 (05-20-24 @ 07:38), Max: 100 (05-20-24 @ 00:02)  HR: 90 (05-20-24 @ 07:38) (90 - 116)  BP: 98/61 (05-20-24 @ 07:38) (94/57 - 126/61)  RR: 18 (05-20-24 @ 07:38) (16 - 18)  SpO2: 96% (05-20-24 @ 07:38) (94% - 96%)    GENERAL: NAD; lying in bed  HEAD:  Atraumatic, Normocephalic  EYES: EOMI, PERRLA, conjunctiva and sclera clear  ENMT: No tonsillar erythema, exudates, or enlargement; Dry mucous membranes  NECK: Supple, normal appearance, No JVD; Trachea midline  NERVOUS SYSTEM:  Alert & Oriented X3,  Motor Strength 5/5 B/L upper and lower extremities, sensation intact  CHEST/LUNG: Lungs clear to auscultation bilaterally, No rales, rhonchi, wheezing   HEART: Regular rate and rhythm; No murmurs, rubs, or gallops  ABDOMEN: Soft, Nontender, Nondistended; Bowel sounds present  EXTREMITIES:  2+ Peripheral Pulses, No clubbing, cyanosis, or edema  SKIN: No rashes or lesions;

## 2024-05-20 NOTE — CONSULT NOTE ADULT - ASSESSMENT
1. Abdominal pain  2. Diarrhea with nausea and vomiting  3. Gastroenteritis  4. Colitis    Suggestions:    1. Check stool for culture  2. Continue antibiotics  3. Monitor electrolytes  4. IVF hydration  5. Diet as tolerated  6. CT-Scan of abdomen and pelvis  7. Protonix 40mg daily  8. Avoid NSAID  9. DVT prophylaxis

## 2024-05-20 NOTE — PATIENT PROFILE ADULT - FALL HARM RISK - UNIVERSAL INTERVENTIONS
Bed in lowest position, wheels locked, appropriate side rails in place/Call bell, personal items and telephone in reach/Instruct patient to call for assistance before getting out of bed or chair/Non-slip footwear when patient is out of bed/[place] to call system/Physically safe environment - no spills, clutter or unnecessary equipment/Purposeful Proactive Rounding/Room/bathroom lighting operational, light cord in reach

## 2024-05-20 NOTE — CONSULT NOTE ADULT - RESPIRATORY
clear to auscultation bilaterally/no wheezes/no rales/no rhonchi/no respiratory distress/no use of accessory muscles/airway patent/breath sounds equal

## 2024-05-20 NOTE — CONSULT NOTE ADULT - NEGATIVE GENERAL GENITOURINARY SYMPTOMS
no hematuria/no renal colic/no flank pain L/no flank pain R/no urine discoloration/no gas in urine/no incontinence/no dysuria

## 2024-05-20 NOTE — H&P ADULT - PROBLEM SELECTOR PLAN 1
- Presented with Nausea, vomiting, diarrhea  - Likely 2/2 GI Source  - Meets Sepsis criteria with tachycardia, Leukocytosis 15k  - Tmax 100F  - Lactate 2.9>1  - UA Negative   - F/U CT A/P for concern of colitis vs diverticulitis as likely source given symptoms   - S/P 4L IVF and Cipro + Flagyl In ED  - F/U Stool Cultures, GI PRC, Stool O&P  - F/U Blood Cultures  - Start IV Fluids LR@80cc/hr  - Start IV Cipro and Flagyl

## 2024-05-20 NOTE — H&P ADULT - HISTORY OF PRESENT ILLNESS
Patient is a 64 year old female from home, ambulates independently, with PMH of SLE, arthritis, and depression who presented to the ED with nausea, vomiting, and diarrhea for the past 2 days.  Patient states her symptoms started yesterday around noon after eating shrimp prior.  Patient states she had several episodes of non bloody watery diarrhea as well as vomiting episodes.  Patient states today she has not had further vomiting or diarrhea and is currently not having abdominal pain or tenderness.  Patient states she believes this may be a reaction to the shrimp she had.  Patient states she feels very dehydrated.  Patient denies subjective fever or chills.  Patient states she does feel unwell but has greatly resolved since onset of symptoms yesterday.  Patient states she is compliant with her medications. Patient denies headache, blurry vision, dizziness, chest pain, constipation, leg swelling.

## 2024-05-20 NOTE — CONSULT NOTE ADULT - NEGATIVE ENMT SYMPTOMS
no hearing difficulty/no ear pain/no tinnitus/no vertigo/no nasal congestion/no nasal discharge/no nasal obstruction/no nose bleeds/no gum bleeding/no dry mouth/no throat pain/no dysphagia

## 2024-05-20 NOTE — H&P ADULT - PROBLEM SELECTOR PLAN 3
- History of Lupus  - Continue home meds of Hydroxychlorquine and Leflunomide - History of Lupus  - Continue home Hydroxychlorquine and hold Leflunomide in setting of infection

## 2024-05-20 NOTE — H&P ADULT - ATTENDING COMMENTS
64 year old female from home, ambulates independently, with PMH of SLE, arthritis, and depression who presented to the ED with nausea, vomiting, and diarrhea for the past 2 days.  Patient states her symptoms started yesterday around noon after eating shrimp prior.  Patient states she had several episodes of non bloody watery diarrhea as well as vomiting episodes.  Patient states today she has not had further vomiting or diarrhea and is currently not having abdominal pain or tenderness.  Patient states she believes this may be a reaction to the shrimp she had.  Patient states she feels very dehydrated.  Patient denies subjective fever or chills.  Patient states she does feel unwell but has greatly resolved since onset of symptoms yesterday.  Patient states she is compliant with her medications. Patient denies headache, blurry vision, dizziness, chest pain, constipation, leg swelling.         assessment  --  acute gastroenterocolitis, sepsis, h/o SLE, arthritis, and depression    plan  --  admit to med, cipro, flagyl, cont preadmit home meds, gi and dvt prophylaxis  cbc, bmp, mg, phos, lipids, tsh, bld cx, stool studies     ct abd pelv     gi cons

## 2024-05-21 LAB
A1C WITH ESTIMATED AVERAGE GLUCOSE RESULT: 5.1 % — SIGNIFICANT CHANGE UP (ref 4–5.6)
ALBUMIN SERPL ELPH-MCNC: 2.8 G/DL — LOW (ref 3.5–5)
ALP SERPL-CCNC: 56 U/L — SIGNIFICANT CHANGE UP (ref 40–120)
ALT FLD-CCNC: 30 U/L DA — SIGNIFICANT CHANGE UP (ref 10–60)
ANION GAP SERPL CALC-SCNC: 1 MMOL/L — LOW (ref 5–17)
AST SERPL-CCNC: 36 U/L — SIGNIFICANT CHANGE UP (ref 10–40)
BILIRUB DIRECT SERPL-MCNC: 0.2 MG/DL — SIGNIFICANT CHANGE UP (ref 0–0.3)
BILIRUB INDIRECT FLD-MCNC: 0.6 MG/DL — SIGNIFICANT CHANGE UP (ref 0.2–1)
BILIRUB SERPL-MCNC: 0.8 MG/DL — SIGNIFICANT CHANGE UP (ref 0.2–1.2)
BLD GP AB SCN SERPL QL: SIGNIFICANT CHANGE UP
BUN SERPL-MCNC: 6 MG/DL — LOW (ref 7–18)
CALCIUM SERPL-MCNC: 7.6 MG/DL — LOW (ref 8.4–10.5)
CANCER AG19-9 SERPL-ACNC: 6 U/ML — SIGNIFICANT CHANGE UP
CEA SERPL-MCNC: 1.6 NG/ML — SIGNIFICANT CHANGE UP (ref 0–3.8)
CHLORIDE SERPL-SCNC: 118 MMOL/L — HIGH (ref 96–108)
CHOLEST SERPL-MCNC: 114 MG/DL — SIGNIFICANT CHANGE UP
CO2 SERPL-SCNC: 25 MMOL/L — SIGNIFICANT CHANGE UP (ref 22–31)
CREAT SERPL-MCNC: 0.49 MG/DL — LOW (ref 0.5–1.3)
E COLI SXT SPEC: DETECTED
EC EAEA GENE STL QL NAA+PROBE: DETECTED
EGFR: 104 ML/MIN/1.73M2 — SIGNIFICANT CHANGE UP
ESTIMATED AVERAGE GLUCOSE: 100 MG/DL — SIGNIFICANT CHANGE UP (ref 68–114)
ETEC DNA STL QL NAA+PROBE: DETECTED
GI PCR PANEL: DETECTED
GLUCOSE SERPL-MCNC: 90 MG/DL — SIGNIFICANT CHANGE UP (ref 70–99)
HCT VFR BLD CALC: 30.7 % — LOW (ref 34.5–45)
HDLC SERPL-MCNC: 54 MG/DL — SIGNIFICANT CHANGE UP
HGB BLD-MCNC: 9.9 G/DL — LOW (ref 11.5–15.5)
LIPID PNL WITH DIRECT LDL SERPL: 46 MG/DL — SIGNIFICANT CHANGE UP
MAGNESIUM SERPL-MCNC: 2.2 MG/DL — SIGNIFICANT CHANGE UP (ref 1.6–2.6)
MCHC RBC-ENTMCNC: 29.3 PG — SIGNIFICANT CHANGE UP (ref 27–34)
MCHC RBC-ENTMCNC: 32.2 GM/DL — SIGNIFICANT CHANGE UP (ref 32–36)
MCV RBC AUTO: 90.8 FL — SIGNIFICANT CHANGE UP (ref 80–100)
NON HDL CHOLESTEROL: 60 MG/DL — SIGNIFICANT CHANGE UP
NRBC # BLD: 0 /100 WBCS — SIGNIFICANT CHANGE UP (ref 0–0)
PHOSPHATE SERPL-MCNC: 1 MG/DL — CRITICAL LOW (ref 2.5–4.5)
PLATELET # BLD AUTO: 195 K/UL — SIGNIFICANT CHANGE UP (ref 150–400)
POTASSIUM SERPL-MCNC: 3.9 MMOL/L — SIGNIFICANT CHANGE UP (ref 3.5–5.3)
POTASSIUM SERPL-SCNC: 3.9 MMOL/L — SIGNIFICANT CHANGE UP (ref 3.5–5.3)
PROT SERPL-MCNC: 5.4 G/DL — LOW (ref 6–8.3)
RBC # BLD: 3.38 M/UL — LOW (ref 3.8–5.2)
RBC # FLD: 14.6 % — HIGH (ref 10.3–14.5)
SODIUM SERPL-SCNC: 144 MMOL/L — SIGNIFICANT CHANGE UP (ref 135–145)
TRIGL SERPL-MCNC: 70 MG/DL — SIGNIFICANT CHANGE UP
TSH SERPL-MCNC: 1.82 UU/ML — SIGNIFICANT CHANGE UP (ref 0.34–4.82)
WBC # BLD: 13.09 K/UL — HIGH (ref 3.8–10.5)
WBC # FLD AUTO: 13.09 K/UL — HIGH (ref 3.8–10.5)

## 2024-05-21 RX ORDER — SODIUM CHLORIDE 9 MG/ML
1000 INJECTION, SOLUTION INTRAVENOUS
Refills: 0 | Status: DISCONTINUED | OUTPATIENT
Start: 2024-05-21 | End: 2024-05-22

## 2024-05-21 RX ORDER — POTASSIUM PHOSPHATE, MONOBASIC POTASSIUM PHOSPHATE, DIBASIC 236; 224 MG/ML; MG/ML
30 INJECTION, SOLUTION INTRAVENOUS ONCE
Refills: 0 | Status: COMPLETED | OUTPATIENT
Start: 2024-05-21 | End: 2024-05-21

## 2024-05-21 RX ADMIN — Medication 100 MILLIGRAM(S): at 05:45

## 2024-05-21 RX ADMIN — TRAMADOL HYDROCHLORIDE 50 MILLIGRAM(S): 50 TABLET ORAL at 16:53

## 2024-05-21 RX ADMIN — POTASSIUM PHOSPHATE, MONOBASIC POTASSIUM PHOSPHATE, DIBASIC 83.33 MILLIMOLE(S): 236; 224 INJECTION, SOLUTION INTRAVENOUS at 09:53

## 2024-05-21 RX ADMIN — Medication 200 MILLIGRAM(S): at 05:45

## 2024-05-21 RX ADMIN — Medication 200 MILLIGRAM(S): at 17:43

## 2024-05-21 RX ADMIN — Medication 100 MILLIGRAM(S): at 21:14

## 2024-05-21 RX ADMIN — Medication 650 MILLIGRAM(S): at 21:17

## 2024-05-21 RX ADMIN — PANTOPRAZOLE SODIUM 40 MILLIGRAM(S): 20 TABLET, DELAYED RELEASE ORAL at 05:45

## 2024-05-21 RX ADMIN — Medication 100 MILLIGRAM(S): at 15:52

## 2024-05-21 RX ADMIN — Medication 650 MILLIGRAM(S): at 22:30

## 2024-05-21 RX ADMIN — TRAMADOL HYDROCHLORIDE 50 MILLIGRAM(S): 50 TABLET ORAL at 15:53

## 2024-05-21 RX ADMIN — Medication 200 MILLIGRAM(S): at 17:44

## 2024-05-21 RX ADMIN — ENOXAPARIN SODIUM 40 MILLIGRAM(S): 100 INJECTION SUBCUTANEOUS at 09:53

## 2024-05-21 RX ADMIN — SODIUM CHLORIDE 75 MILLILITER(S): 9 INJECTION, SOLUTION INTRAVENOUS at 21:15

## 2024-05-21 RX ADMIN — SODIUM CHLORIDE 75 MILLILITER(S): 9 INJECTION, SOLUTION INTRAVENOUS at 17:41

## 2024-05-21 NOTE — CONSULT NOTE ADULT - REASON FOR ADMISSION
Severe Sepsis 2/2 likely colitis vs diverticulitis
Severe Sepsis 2/2 likely colitis vs diverticulitis

## 2024-05-21 NOTE — CONSULT NOTE ADULT - ASSESSMENT
Patient is a 64y old  Female who is from home, ambulates independently, with PMH of SLE, arthritis, and depression, now presents to the ER for evaluation of nausea, vomiting, and diarrhea for the past 2 days.  Patient states her symptoms started yesterday around noon after eating shrimp prior.  Patient  feels very dehydrated, have subjective fever or chills. On admission, she found to have low grade fever, tachycardia ,Leukocytosis and stool positive for STEC, ETEC and EAEC. She has started on Cipro and Flagyl and the ID consult requested to assist with further evaluation and antibiotic management.    # Sepsis ( Low grade fever + Tachycardia+ Leukocytosis)  # STEC/ETEC/EAEC Colitis vs Diverticulitis as per CT abd/pelvis    would recommend:    1. Follow up Final Blood cultures  2. Monitor electrolytes and supplement as needed  3. Continue Cipro and flagyl for now  4. Monitor WBC count    will follow the patient with you and make further recommendation based on the clinical course and Lab results  Thank you for the opportunity to participate in Ms. PEREZ's care    Attending Attestation:    Spent more than 65 minutes on total encounter, more than 50 % of the visit was spent counseling and/or coordinating care by the Attending physician.     Patient is a 64y old  Female who is from home, ambulates independently, with PMH of SLE, arthritis, and depression, now presents to the ER for evaluation of nausea, vomiting, and diarrhea for the past 2 days.  Patient states her symptoms started yesterday around noon after eating shrimp prior.  Patient  feels very dehydrated, have subjective fever or chills. On admission, she found to have low grade fever, tachycardia ,Leukocytosis and stool positive for STEC, ETEC and EAEC. She has started on Cipro and Flagyl and the ID consult requested to assist with further evaluation and antibiotic management.    # Sepsis ( Low grade fever + Tachycardia+ Leukocytosis)- resolving   # STEC/ETEC/EAEC Colitis vs Diverticulitis as per CT abd/pelvis    would recommend:    1. Follow up Final Blood cultures  2. Monitor electrolytes and supplement as needed in the setting of diarrhea  3. Continue Cipro and flagyl for now  4. Monitor WBC count    will follow the patient with you and make further recommendation based on the clinical course and Lab results  Thank you for the opportunity to participate in Ms. PEREZ's care    Attending Attestation:    Spent more than 65 minutes on total encounter, more than 50 % of the visit was spent counseling and/or coordinating care by the Attending physician.

## 2024-05-21 NOTE — PROGRESS NOTE ADULT - SUBJECTIVE AND OBJECTIVE BOX
[   ] ICU                                          [   ] CCU                                      [ X  ] Medical Floor    Patient is a 64 year old female with abdominal pain, diarrhea, nausea and vomiting. GI consulted to evaluate.          Patient is a 64 year old female from home, ambulates independently, with past medical history significant for SLE, arthritis, and depression who presented to the emergency room with 2 days history of progressively worsening watery non bloody diarrhea associated with nausea, non bloody vomiting and intermittent diffuse 5-6/10 intensity non radiating crampy abdominal pain. Patient denies hematemesis, hematochezia, melena, fever, chills, chest pain, SOB, cough, hematuria, dysuria, recent traveling or antibiotics intake.    Patient appears comfortable. Patient with diarrhea other wise no new complaints reported, No abdominal pain, N/V, hematemesis, hematochezia, melena, fever, chills, chest pain, SOB, cough reported.           PAST MEDICAL HISTORY    SLE (systemic lupus erythematosus)    Arthritis    Major depression        PAST SURGICAL HISTORY    Hysterectomy    Breast surgery        Allergies    No Known Allergies    Intolerances  None          SOCIAL HISTORY  Advanced Directives:       [ X ] Full Code       [  ] DNR  Marital Status:         [  ] M      [ X ] S      [  ] D       [  ] W  Children:       [X  ] Yes      [  ] No  Occupation:        [  ] Employed       [ X ] Unemployed       [  ] Retired  Diet:       [X  ] Regular       [  ] PEG feeding          [  ] NG tube feeding  Drug Use:           [ X ] Patient denied          [  ] Yes  Alcohol:           [ X ] No             [  ] Yes (socially)         [  ] Yes (chronic)  Tobacco:           [  ] Yes           [ X ] No      FAMILY HISTORY  [ X ] Heart Disease            [ X ] Diabetes             [ X ] HTN             [  ] Colon Cancer             [  ] Stomach Cancer              [  ] Pancreatic Cancer          VITALS   Vital Signs Last 24 Hrs  T(C): 36.8 (05-21-24 @ 13:10), Max: 36.9 (05-21-24 @ 05:23)  T(F): 98.2 (05-21-24 @ 13:10), Max: 98.4 (05-21-24 @ 05:23)  HR: 84 (05-21-24 @ 13:10) (80 - 87)  BP: 122/81 (05-21-24 @ 13:10) (113/71 - 129/77)  BP(mean): 91 (05-21-24 @ 05:23) (81 - 91)  RR: 18 (05-21-24 @ 13:10) (18 - 18)  SpO2: 100% (05-21-24 @ 13:10) (96% - 100%)      MEDICATIONS  (STANDING):  ciprofloxacin   IVPB 400 milliGRAM(s) IV Intermittent every 12 hours  enoxaparin Injectable 40 milliGRAM(s) SubCutaneous every 24 hours  hydroxychloroquine 200 milliGRAM(s) Oral two times a day  lactated ringers. 1000 milliLiter(s) (75 mL/Hr) IV Continuous <Continuous>  metroNIDAZOLE  IVPB      metroNIDAZOLE  IVPB 500 milliGRAM(s) IV Intermittent every 8 hours  pantoprazole    Tablet 40 milliGRAM(s) Oral before breakfast    MEDICATIONS  (PRN):  acetaminophen     Tablet .. 650 milliGRAM(s) Oral every 6 hours PRN Temp greater or equal to 38C (100.4F), Mild Pain (1 - 3)  aluminum hydroxide/magnesium hydroxide/simethicone Suspension 30 milliLiter(s) Oral every 4 hours PRN Dyspepsia  melatonin 3 milliGRAM(s) Oral at bedtime PRN Insomnia  ondansetron Injectable 4 milliGRAM(s) IV Push every 8 hours PRN Nausea and/or Vomiting  traMADol 50 milliGRAM(s) Oral three times a day PRN Moderate Pain (4 - 6)                            9.9    13.09 )-----------( 195      ( 21 May 2024 05:11 )             30.7       05-21    144  |  118<H>  |  6<L>  ----------------------------<  90  3.9   |  25  |  0.49<L>    Ca    7.6<L>      21 May 2024 05:11  Phos  1.0     05-21  Mg     2.2     05-21    TPro  5.4<L>  /  Alb  2.8<L>  /  TBili  0.8  /  DBili  0.2  /  AST  36  /  ALT  30  /  AlkPhos  56  05-21      ACC: [id number] EXAM:  CT ABDOMEN AND PELVIS IC   ORDERED BY: KINGSTON ESPARZA     PROCEDURE DATE:  05/20/2024          INTERPRETATION:  CLINICAL INFORMATION: Generalized abdominal pain,   nausea, vomiting and diarrhea    COMPARISON: None.    CONTRAST/COMPLICATIONS:  IV Contrast: Omnipaque 350  90 cc administered   10 cc discarded  Oral Contrast: NONE  Complications: None reported at time of study completion    PROCEDURE:  CT of the Abdomen and Pelvis was performed.  Sagittal and coronal reformats were performed.    FINDINGS:  LOWER CHEST: Small bilateral atelectasis.    LIVER: Within normal limits.  BILE DUCTS: Normal caliber.  GALLBLADDER: No CT evidence for gallstones, or thickened gallbladder   wall. Possible trace pericholecystic fluid. If clinically indicated,   gallbladder ultrasound may be pursued for further evaluation.  SPLEEN: Within normal limits.  PANCREAS: Within normal limits.  ADRENALS: Within normal limits.  KIDNEYS/URETERS: Within normal limits except for a tiny hypodense lesion   in the left kidney, too small to characterize.    BLADDER: Within normal limits.  REPRODUCTIVE ORGANS: The uterus is not visualized, likely surgically   absent. The adnexa appear grossly unremarkable.    BOWEL: No bowel obstruction. Appendix within normal limits. Colonic   diverticulosis. Apparent short segment mural thickening at the distal   sigmoid colon may represent a nonspecific colitis, or diverticulitis.   Clinical correlation is recommended. No associated abscess is identified.   Follow-up colonoscopy after treatment/resolution of acute disease may be   pursued to rule out colon cancer. Status post sleeve gastrectomy. Small   hiatal hernia.  PERITONEUM: Trace pelvic ascites. No free air.  VESSELS: Mild calcified atherosclerotic disease.  RETROPERITONEUM/LYMPH NODES: No lymphadenopathy.  ABDOMINAL WALL: Within normal limits.  BONES: Unremarkable.    IMPRESSION:  Colonic diverticulosis. Apparent short segment mural thickening at the   distal sigmoid colon may represent a nonspecific colitis, or   diverticulitis. Clinical correlation is recommended. No associated   abscess is identified. Follow-up colonoscopy after treatment/resolution   of acute disease may be pursued to rule out colon cancer.    Trace pelvic ascites.    No CT evidence for gallstones, or thickened gallbladder wall. Possible   trace pericholecystic fluid. If clinically indicated, gallbladder   ultrasound may be pursued for further evaluation.

## 2024-05-21 NOTE — PROGRESS NOTE ADULT - SUBJECTIVE AND OBJECTIVE BOX
Patient is a 64y old  Female who presents with a chief complaint of Severe Sepsis 2/2 likely colitis vs diverticulitis (20 May 2024 08:55)    pt seen in icu [  ], reg med floor [   ], bed [  ], chair at bedside [   ], a+o x3 [  ], lethargic [  ],  nad [  ]    mejia [  ], ngt [  ], peg [  ], et tube [  ], cent line [  ], picc line [  ]        Allergies    No Known Allergies        Vitals    T(F): 98.4 (05-21-24 @ 05:23), Max: 98.4 (05-21-24 @ 05:23)  HR: 80 (05-21-24 @ 05:23) (80 - 98)  BP: 129/77 (05-21-24 @ 05:23) (98/61 - 129/77)  RR: 18 (05-21-24 @ 05:23) (18 - 18)  SpO2: 97% (05-21-24 @ 05:23) (96% - 98%)  Wt(kg): --  CAPILLARY BLOOD GLUCOSE          Labs                          9.9    13.09 )-----------( 195      ( 21 May 2024 05:11 )             30.7       05-21    144  |  118<H>  |  6<L>  ----------------------------<  90  3.9   |  25  |  x     Ca    7.6<L>      21 May 2024 05:11  Mg     2.2     05-21    TPro  x   /  Alb  2.8<L>  /  TBili  x   /  DBili  x   /  AST  x   /  ALT  x   /  AlkPhos  x   05-21                Radiology Results      Meds    MEDICATIONS  (STANDING):  ciprofloxacin   IVPB 400 milliGRAM(s) IV Intermittent every 12 hours  enoxaparin Injectable 40 milliGRAM(s) SubCutaneous every 24 hours  hydroxychloroquine 200 milliGRAM(s) Oral two times a day  lactated ringers. 1000 milliLiter(s) (80 mL/Hr) IV Continuous <Continuous>  metroNIDAZOLE  IVPB      metroNIDAZOLE  IVPB 500 milliGRAM(s) IV Intermittent every 8 hours  pantoprazole    Tablet 40 milliGRAM(s) Oral before breakfast      MEDICATIONS  (PRN):  acetaminophen     Tablet .. 650 milliGRAM(s) Oral every 6 hours PRN Temp greater or equal to 38C (100.4F), Mild Pain (1 - 3)  aluminum hydroxide/magnesium hydroxide/simethicone Suspension 30 milliLiter(s) Oral every 4 hours PRN Dyspepsia  melatonin 3 milliGRAM(s) Oral at bedtime PRN Insomnia  ondansetron Injectable 4 milliGRAM(s) IV Push every 8 hours PRN Nausea and/or Vomiting  traMADol 50 milliGRAM(s) Oral three times a day PRN Moderate Pain (4 - 6)      Physical Exam    Neuro :  no focal deficits  Respiratory: CTA B/L  CV: RRR, S1S2, no murmurs,   Abdominal: Soft, NT, ND +BS,  Extremities: No edema, + peripheral pulses    ASSESSMENT    acute gastroenterocolitis,   sepsis,   h/o SLE,   arthritis, and depression    Noninfectious gastroenteritis    SLE (systemic lupus erythematosus)    Arthritis    Major depression    H/O: hysterectomy    History of breast surgery        PLAN    admit to med,   cipro,   flagyl,   cont preadmit home meds,   gi and dvt prophylaxis  cbc,   bmp,   mg,   phos,   lipids,   tsh,   bld cx,   stool studies     ct abd pelv     gi cons .     Patient is a 64y old  Female who presents with a chief complaint of Severe Sepsis 2/2 likely colitis vs diverticulitis (20 May 2024 08:55)    pt seen in icu [  ], reg med floor [ x  ], bed [ x ], chair at bedside [   ], a+o x3 [x ], lethargic [  ],  nad [ x ]      Allergies    No Known Allergies        Vitals    T(F): 98.4 (05-21-24 @ 05:23), Max: 98.4 (05-21-24 @ 05:23)  HR: 80 (05-21-24 @ 05:23) (80 - 98)  BP: 129/77 (05-21-24 @ 05:23) (98/61 - 129/77)  RR: 18 (05-21-24 @ 05:23) (18 - 18)  SpO2: 97% (05-21-24 @ 05:23) (96% - 98%)  Wt(kg): --  CAPILLARY BLOOD GLUCOSE          Labs                          9.9    13.09 )-----------( 195      ( 21 May 2024 05:11 )             30.7       05-21    144  |  118<H>  |  6<L>  ----------------------------<  90  3.9   |  25  |  x     Ca    7.6<L>      21 May 2024 05:11  Mg     2.2     05-21    TPro  x   /  Alb  2.8<L>  /  TBili  x   /  DBili  x   /  AST  x   /  ALT  x   /  AlkPhos  x   05-21      GI PCR Panel Stool (05.20.24 @ 21:40)   GI PCR Panel: Detected:   Enteroaggregative E. coli (EAEC): Detected  Enterotoxigenic E.coli (ETEC) lt/st: Detected  Shiga-like toxin-producing E.coli (STEC) stx1/stx2: Detected          < from: CT Abdomen and Pelvis w/ IV Cont (05.20.24 @ 10:00) >  IMPRESSION:  Colonic diverticulosis. Apparent short segment mural thickening at the   distal sigmoid colon may represent a nonspecific colitis, or   diverticulitis. Clinical correlation is recommended. No associated   abscess is identified. Follow-up colonoscopy after treatment/resolution   of acute disease may be pursued to rule out colon cancer.    Trace pelvic ascites.    No CT evidence for gallstones, or thickened gallbladder wall. Possible   trace pericholecystic fluid. If clinically indicated, gallbladder   ultrasound may be pursued for further evaluation.    < end of copied text >            Radiology Results      Meds    MEDICATIONS  (STANDING):  ciprofloxacin   IVPB 400 milliGRAM(s) IV Intermittent every 12 hours  enoxaparin Injectable 40 milliGRAM(s) SubCutaneous every 24 hours  hydroxychloroquine 200 milliGRAM(s) Oral two times a day  lactated ringers. 1000 milliLiter(s) (80 mL/Hr) IV Continuous <Continuous>  metroNIDAZOLE  IVPB      metroNIDAZOLE  IVPB 500 milliGRAM(s) IV Intermittent every 8 hours  pantoprazole    Tablet 40 milliGRAM(s) Oral before breakfast      MEDICATIONS  (PRN):  acetaminophen     Tablet .. 650 milliGRAM(s) Oral every 6 hours PRN Temp greater or equal to 38C (100.4F), Mild Pain (1 - 3)  aluminum hydroxide/magnesium hydroxide/simethicone Suspension 30 milliLiter(s) Oral every 4 hours PRN Dyspepsia  melatonin 3 milliGRAM(s) Oral at bedtime PRN Insomnia  ondansetron Injectable 4 milliGRAM(s) IV Push every 8 hours PRN Nausea and/or Vomiting  traMADol 50 milliGRAM(s) Oral three times a day PRN Moderate Pain (4 - 6)      Physical Exam    Neuro :  no focal deficits  Respiratory: CTA B/L  CV: RRR, S1S2, no murmurs,   Abdominal: Soft, NT, ND +BS,  Extremities: No edema, + peripheral pulses    ASSESSMENT    acute gastroenterocolitis,   sepsis,   h/o SLE,   arthritis,   depression        PLAN    cont cipro, flagyl,   stool pcr with Enteroaggregative E. coli (EAEC):, Enterotoxigenic E.coli (ETEC) lt/st:  Shiga-like toxin-producing E.coli (STEC) stx1/stx2: noted above  id cons   ct abd pelv with Colonic diverticulosis. Apparent short segment mural thickening at the   distal sigmoid colon may represent a nonspecific colitis, or   diverticulitis. Clinical correlation is recommended. No associated   abscess is identified. Follow-up colonoscopy after treatment/resolution   of acute disease may be pursued to rule out colon cancer. Trace pelvic ascites.  No CT evidence for gallstones, or thickened gallbladder wall. Possible   trace pericholecystic fluid. If clinically indicated, gallbladder   ultrasound may be pursued for further evaluation noted above.     f/u cea   gi f/u   IVF hydration  Diet as tolerated  Protonix 40mg daily  Avoid NSAID  cont current meds     Patient is a 64y old  Female who presents with a chief complaint of Severe Sepsis 2/2 likely colitis vs diverticulitis (20 May 2024 08:55)    pt seen in icu [  ], reg med floor [ x  ], bed [ x ], chair at bedside [   ], a+o x3 [x ], lethargic [  ],  nad [ x ]      Allergies    No Known Allergies        Vitals    T(F): 98.4 (05-21-24 @ 05:23), Max: 98.4 (05-21-24 @ 05:23)  HR: 80 (05-21-24 @ 05:23) (80 - 98)  BP: 129/77 (05-21-24 @ 05:23) (98/61 - 129/77)  RR: 18 (05-21-24 @ 05:23) (18 - 18)  SpO2: 97% (05-21-24 @ 05:23) (96% - 98%)  Wt(kg): --  CAPILLARY BLOOD GLUCOSE          Labs                          9.9    13.09 )-----------( 195      ( 21 May 2024 05:11 )             30.7       05-21    144  |  118<H>  |  6<L>  ----------------------------<  90  3.9   |  25  |  x     Ca    7.6<L>      21 May 2024 05:11  Mg     2.2     05-21    TPro  x   /  Alb  2.8<L>  /  TBili  x   /  DBili  x   /  AST  x   /  ALT  x   /  AlkPhos  x   05-21      GI PCR Panel Stool (05.20.24 @ 21:40)   GI PCR Panel: Detected:   Enteroaggregative E. coli (EAEC): Detected  Enterotoxigenic E.coli (ETEC) lt/st: Detected  Shiga-like toxin-producing E.coli (STEC) stx1/stx2: Detected          < from: CT Abdomen and Pelvis w/ IV Cont (05.20.24 @ 10:00) >  IMPRESSION:  Colonic diverticulosis. Apparent short segment mural thickening at the   distal sigmoid colon may represent a nonspecific colitis, or   diverticulitis. Clinical correlation is recommended. No associated   abscess is identified. Follow-up colonoscopy after treatment/resolution   of acute disease may be pursued to rule out colon cancer.    Trace pelvic ascites.    No CT evidence for gallstones, or thickened gallbladder wall. Possible   trace pericholecystic fluid. If clinically indicated, gallbladder   ultrasound may be pursued for further evaluation.    < end of copied text >            Radiology Results      Meds    MEDICATIONS  (STANDING):  ciprofloxacin   IVPB 400 milliGRAM(s) IV Intermittent every 12 hours  enoxaparin Injectable 40 milliGRAM(s) SubCutaneous every 24 hours  hydroxychloroquine 200 milliGRAM(s) Oral two times a day  lactated ringers. 1000 milliLiter(s) (80 mL/Hr) IV Continuous <Continuous>  metroNIDAZOLE  IVPB      metroNIDAZOLE  IVPB 500 milliGRAM(s) IV Intermittent every 8 hours  pantoprazole    Tablet 40 milliGRAM(s) Oral before breakfast      MEDICATIONS  (PRN):  acetaminophen     Tablet .. 650 milliGRAM(s) Oral every 6 hours PRN Temp greater or equal to 38C (100.4F), Mild Pain (1 - 3)  aluminum hydroxide/magnesium hydroxide/simethicone Suspension 30 milliLiter(s) Oral every 4 hours PRN Dyspepsia  melatonin 3 milliGRAM(s) Oral at bedtime PRN Insomnia  ondansetron Injectable 4 milliGRAM(s) IV Push every 8 hours PRN Nausea and/or Vomiting  traMADol 50 milliGRAM(s) Oral three times a day PRN Moderate Pain (4 - 6)      Physical Exam    Neuro :  no focal deficits  Respiratory: CTA B/L  CV: RRR, S1S2, no murmurs,   Abdominal: Soft, NT, ND +BS,  Extremities: No edema, + peripheral pulses    ASSESSMENT    acute gastroenterocolitis,   sepsis,   h/o SLE,   arthritis,   depression        PLAN    cont cipro, flagyl,   stool pcr with Enteroaggregative E. coli (EAEC):, Enterotoxigenic E.coli (ETEC) lt/st:  Shiga-like toxin-producing E.coli (STEC) stx1/stx2: noted above   id cons   ct abd pelv with Colonic diverticulosis. Apparent short segment mural thickening at the   distal sigmoid colon may represent a nonspecific colitis, or   diverticulitis. Clinical correlation is recommended. No associated   abscess is identified. Follow-up colonoscopy after treatment/resolution   of acute disease may be pursued to rule out colon cancer. Trace pelvic ascites.  No CT evidence for gallstones, or thickened gallbladder wall. Possible   trace pericholecystic fluid. If clinically indicated, gallbladder   ultrasound may be pursued for further evaluation noted above.     f/u cea   diarrhea resolved  gi f/u   IVF hydration  adv Diet as tolerated  Protonix 40mg daily  Avoid NSAID  cont current meds

## 2024-05-21 NOTE — PROGRESS NOTE ADULT - ASSESSMENT
Patient is a 64 year old female from home, ambulates independently, with PMH of SLE, arthritis, and depression who presented to the ED with nausea, vomiting, and diarrhea for the past 2 days.  In the ED patient BP soft with SBP in 90s despite aggressive IV Fluid hydration with 4L.  Patient saturating well on RA and Temp max of 100F.  patients WBC of 15k and lactate initially elevated to 2.9 which improved with fluids to 1.  patient is being admitted to medicine for Severe sepsis 2/2 likely GI source.    PATIENT STATES SHE IS ONLY TAKING HYDROXYCHLORQUINE TRAMADOL, AND LEFLUNOMIDE HOWEVER PHARMACY ALSO HAS LISTED Lexipro 10mg, Alendronate, Magnesium 400mg qd, Gabapentin 800mg qd, Amitriptyline 10mg qd.  WILL RESUME ONLY MEDS PATIENT STATS SHE IS ACTIVELY TAKING

## 2024-05-21 NOTE — CONSULT NOTE ADULT - SUBJECTIVE AND OBJECTIVE BOX
Patient is a 64y old  Female who is from home, ambulates independently, with PMH of SLE, arthritis, and depression, now presents to the ER for evaluation of nausea, vomiting, and diarrhea for the past 2 days.  Patient states her symptoms started yesterday around noon after eating shrimp prior.  Patient  feels very dehydrated, have subjective fever or chills. On admission, she found to have low grade fever, tachycardia ,Leukocytosis and stool positive for STEC, ETEC and EAEC. She has started on Cipro and Flagyl and the ID consult requested to assist with further evaluation and antibiotic management.      REVIEW OF SYSTEMS: Total of twelve systems have been reviewed with patient and found to be negative unless mentioned in HPI      PAST MEDICAL & SURGICAL HISTORY:  SLE (systemic lupus erythematosus)  Arthritis  Major depression  H/O: hysterectomy  History of breast surgery      SOCIAL HISTORY  Alcohol: Does not drink  Tobacco: Does not smoke  Illicit substance use: None      FAMILY HISTORY: Non contributory to the present illness      ALLERGIES: No Known Allergies      Vital Signs Last 24 Hrs  T(C): 36.9 (21 May 2024 05:23), Max: 36.9 (21 May 2024 05:23)  T(F): 98.4 (21 May 2024 05:23), Max: 98.4 (21 May 2024 05:23)  HR: 80 (21 May 2024 05:23) (80 - 87)  BP: 129/77 (21 May 2024 05:23) (113/71 - 129/77)  BP(mean): 91 (21 May 2024 05:23) (81 - 91)  RR: 18 (21 May 2024 05:23) (18 - 18)  SpO2: 97% (21 May 2024 05:23) (96% - 98%)    Parameters below as of 21 May 2024 05:23  Patient On (Oxygen Delivery Method): room air      PHYSICAL EXAM:  GENERAL: Not in distress   CHEST/LUNG: Not using accessory muscles   HEART: s1 and s2 present  ABDOMEN:  Nontender and  Nondistended  EXTREMITIES: No pedal  edema  CNS: Awake and Alert      LABS:                        9.9    13.09 )-----------( 195      ( 21 May 2024 05:11 )             30.7       05-21    144  |  118<H>  |  6<L>  ----------------------------<  90  3.9   |  25  |  0.49<L>    Ca    7.6<L>      21 May 2024 05:11  Phos  1.0     05-21  Mg     2.2     05-21    TPro  5.4<L>  /  Alb  2.8<L>  /  TBili  0.8  /  DBili  0.2  /  AST  36  /  ALT  30  /  AlkPhos  56  05-21      MEDICATIONS  (STANDING):  ciprofloxacin   IVPB 400 milliGRAM(s) IV Intermittent every 12 hours  enoxaparin Injectable 40 milliGRAM(s) SubCutaneous every 24 hours  hydroxychloroquine 200 milliGRAM(s) Oral two times a day  lactated ringers. 1000 milliLiter(s) (80 mL/Hr) IV Continuous <Continuous>  metroNIDAZOLE  IVPB      metroNIDAZOLE  IVPB 500 milliGRAM(s) IV Intermittent every 8 hours  pantoprazole    Tablet 40 milliGRAM(s) Oral before breakfast    MEDICATIONS  (PRN):  acetaminophen     Tablet .. 650 milliGRAM(s) Oral every 6 hours PRN Temp greater or equal to 38C (100.4F), Mild Pain (1 - 3)  aluminum hydroxide/magnesium hydroxide/simethicone Suspension 30 milliLiter(s) Oral every 4 hours PRN Dyspepsia  melatonin 3 milliGRAM(s) Oral at bedtime PRN Insomnia  ondansetron Injectable 4 milliGRAM(s) IV Push every 8 hours PRN Nausea and/or Vomiting  traMADol 50 milliGRAM(s) Oral three times a day PRN Moderate Pain (4 - 6)      RADIOLOGY & ADDITIONAL TESTS:    5/20/24 : CT Abdomen and Pelvis w/ IV Cont (05.20.24 @ 10:00) Colonic diverticulosis. Apparent short segment mural thickening at the   distal sigmoid colon may represent a nonspecific colitis, or diverticulitis. Clinical correlation is recommended. No associated   abscess is identified. Follow-up colonoscopy after treatment/resolution of acute disease may be pursued to rule out colon cancer.    Trace pelvic ascites.    No CT evidence for gallstones, or thickened gallbladder wall. Possible trace pericholecystic fluid. If clinically indicated, gallbladder   ultrasound may be pursued for further evaluation.      5/20/24 : Xray Chest 1 View-PORTABLE IMMEDIATE (Xray Chest 1 View-PORTABLE IMMEDIATE .) (05.20.24 @ 01:08) >  IMPRESSION: No acute cardiopulmonary disease process.        MICROBIOLOGY DATA:    GI PCR Panel Stool (05.20.24 @ 21:40)   GI PCR Panel: Detected:   For culture and susceptibility reports refer to "reflex stool culture".  Enteroaggregative E. coli (EAEC): Detected  Enterotoxigenic E.coli (ETEC) lt/st: Detected  Shiga-like toxin-producing E.coli (STEC) stx1/stx2: Detected    Culture - Blood (05.20.24 @ 05:48)   Specimen Source: .Blood Blood-Peripheral  Culture Results: No growth at 24 hours    Culture - Blood (05.20.24 @ 05:32)   Specimen Source: .Blood Blood-Peripheral  Culture Results: No growth at 24 hours

## 2024-05-21 NOTE — PROGRESS NOTE ADULT - SUBJECTIVE AND OBJECTIVE BOX
PGY-1 Progress Note discussed with attending    PAGER #: [phone]] TILL 5:00 PM  PLEASE CONTACT ON CALL TEAM:  - On Call Team (Please refer to Aleta) FROM 5:00 PM - 8:30PM  - Nightfloat Team FROM 8:30 -7:30 AM    INTERVAL HPI/OVERNIGHT EVENTS: No acute events overnight.  Patient examined at bedside this AM.  Patient denies acute complaints. Patient denies any recent diarrhea or abdominal pain.      REVIEW OF SYSTEMS:  CONSTITUTIONAL: No fever, weight loss, or fatigue  RESPIRATORY: No cough, wheezing, chills or hemoptysis; No shortness of breath  CARDIOVASCULAR: No chest pain, palpitations, dizziness, or leg swelling  GASTROINTESTINAL: No abdominal pain. No nausea, vomiting, or hematemesis; No diarrhea or constipation. No melena or hematochezia.  GENITOURINARY: No dysuria or hematuria, urinary frequency  NEUROLOGICAL: No headaches, memory loss, loss of strength, numbness, or tremors  SKIN: No itching, burning, rashes, or lesions     Vital Signs Last 24 Hrs  T(C): 36.9 (21 May 2024 05:23), Max: 36.9 (21 May 2024 05:23)  T(F): 98.4 (21 May 2024 05:23), Max: 98.4 (21 May 2024 05:23)  HR: 80 (21 May 2024 05:23) (80 - 98)  BP: 129/77 (21 May 2024 05:23) (113/71 - 129/77)  BP(mean): 91 (21 May 2024 05:23) (81 - 91)  RR: 18 (21 May 2024 05:23) (18 - 18)  SpO2: 97% (21 May 2024 05:23) (96% - 98%)    Parameters below as of 21 May 2024 05:23  Patient On (Oxygen Delivery Method): room air        PHYSICAL EXAMINATION:  GENERAL: NAD; lying in bed  HEAD:  Atraumatic, Normocephalic  EYES: EOMI, PERRLA, conjunctiva and sclera clear  ENMT: No tonsillar erythema, exudates, or enlargement; Dry mucous membranes  NECK: Supple, normal appearance, No JVD; Trachea midline  NERVOUS SYSTEM:  Alert & Oriented X3,  Motor Strength 5/5 B/L upper and lower extremities, sensation intact  CHEST/LUNG: Lungs clear to auscultation bilaterally, No rales, rhonchi, wheezing   HEART: Regular rate and rhythm; No murmurs, rubs, or gallops  ABDOMEN: Soft, Nontender, Nondistended; Bowel sounds present  EXTREMITIES:  2+ Peripheral Pulses, No clubbing, cyanosis, or edema  SKIN: No rashes or lesions;                          9.9    13.09 )-----------( 195      ( 21 May 2024 05:11 )             30.7     05-21    144  |  118<H>  |  6<L>  ----------------------------<  90  3.9   |  25  |  0.49<L>    Ca    7.6<L>      21 May 2024 05:11  Phos  1.0     05-21  Mg     2.2     05-21    TPro  5.4<L>  /  Alb  2.8<L>  /  TBili  0.8  /  DBili  0.2  /  AST  36  /  ALT  30  /  AlkPhos  56  05-21    LIVER FUNCTIONS - ( 21 May 2024 05:11 )  Alb: 2.8 g/dL / Pro: 5.4 g/dL / ALK PHOS: 56 U/L / ALT: 30 U/L DA / AST: 36 U/L / GGT: x                   CAPILLARY BLOOD GLUCOSE      RADIOLOGY & ADDITIONAL TESTS:

## 2024-05-21 NOTE — PROGRESS NOTE ADULT - ASSESSMENT
1. Abdominal pain  2. Diarrhea with nausea and vomiting  3. Infectious gastroenteritis  4. Colitis    Suggestions:    1. ID follow up  2. Continue antibiotics as per ID  3. Monitor electrolytes  4. IVF hydration  5. Diet as tolerated  6. colonoscopy in 6-8 weeks out patient  7. Protonix 40mg daily  8. Avoid NSAID  9. DVT prophylaxis

## 2024-05-22 ENCOUNTER — TRANSCRIPTION ENCOUNTER (OUTPATIENT)
Age: 64
End: 2024-05-22

## 2024-05-22 VITALS
HEART RATE: 79 BPM | TEMPERATURE: 98 F | RESPIRATION RATE: 18 BRPM | SYSTOLIC BLOOD PRESSURE: 132 MMHG | OXYGEN SATURATION: 97 % | DIASTOLIC BLOOD PRESSURE: 97 MMHG

## 2024-05-22 LAB
ALBUMIN SERPL ELPH-MCNC: 3 G/DL — LOW (ref 3.5–5)
ALP SERPL-CCNC: 75 U/L — SIGNIFICANT CHANGE UP (ref 40–120)
ALT FLD-CCNC: 37 U/L DA — SIGNIFICANT CHANGE UP (ref 10–60)
ANION GAP SERPL CALC-SCNC: 3 MMOL/L — LOW (ref 5–17)
AST SERPL-CCNC: 47 U/L — HIGH (ref 10–40)
BILIRUB SERPL-MCNC: 0.6 MG/DL — SIGNIFICANT CHANGE UP (ref 0.2–1.2)
BUN SERPL-MCNC: 3 MG/DL — LOW (ref 7–18)
CALCIUM SERPL-MCNC: 8 MG/DL — LOW (ref 8.4–10.5)
CHLORIDE SERPL-SCNC: 116 MMOL/L — HIGH (ref 96–108)
CO2 SERPL-SCNC: 26 MMOL/L — SIGNIFICANT CHANGE UP (ref 22–31)
CREAT SERPL-MCNC: 0.47 MG/DL — LOW (ref 0.5–1.3)
CULTURE RESULTS: NO GROWTH — SIGNIFICANT CHANGE UP
CULTURE RESULTS: SIGNIFICANT CHANGE UP
CULTURE RESULTS: SIGNIFICANT CHANGE UP
EGFR: 105 ML/MIN/1.73M2 — SIGNIFICANT CHANGE UP
GLUCOSE SERPL-MCNC: 93 MG/DL — SIGNIFICANT CHANGE UP (ref 70–99)
HCT VFR BLD CALC: 32.7 % — LOW (ref 34.5–45)
HGB BLD-MCNC: 10.6 G/DL — LOW (ref 11.5–15.5)
MAGNESIUM SERPL-MCNC: 2.3 MG/DL — SIGNIFICANT CHANGE UP (ref 1.6–2.6)
MCHC RBC-ENTMCNC: 29.1 PG — SIGNIFICANT CHANGE UP (ref 27–34)
MCHC RBC-ENTMCNC: 32.4 GM/DL — SIGNIFICANT CHANGE UP (ref 32–36)
MCV RBC AUTO: 89.8 FL — SIGNIFICANT CHANGE UP (ref 80–100)
NRBC # BLD: 0 /100 WBCS — SIGNIFICANT CHANGE UP (ref 0–0)
PHOSPHATE SERPL-MCNC: 1.8 MG/DL — LOW (ref 2.5–4.5)
PLATELET # BLD AUTO: 217 K/UL — SIGNIFICANT CHANGE UP (ref 150–400)
POTASSIUM SERPL-MCNC: 4 MMOL/L — SIGNIFICANT CHANGE UP (ref 3.5–5.3)
POTASSIUM SERPL-SCNC: 4 MMOL/L — SIGNIFICANT CHANGE UP (ref 3.5–5.3)
PROT SERPL-MCNC: 6 G/DL — SIGNIFICANT CHANGE UP (ref 6–8.3)
RBC # BLD: 3.64 M/UL — LOW (ref 3.8–5.2)
RBC # FLD: 14.2 % — SIGNIFICANT CHANGE UP (ref 10.3–14.5)
SODIUM SERPL-SCNC: 145 MMOL/L — SIGNIFICANT CHANGE UP (ref 135–145)
SPECIMEN SOURCE: SIGNIFICANT CHANGE UP
WBC # BLD: 8.95 K/UL — SIGNIFICANT CHANGE UP (ref 3.8–10.5)
WBC # FLD AUTO: 8.95 K/UL — SIGNIFICANT CHANGE UP (ref 3.8–10.5)

## 2024-05-22 RX ORDER — POTASSIUM PHOSPHATE, MONOBASIC POTASSIUM PHOSPHATE, DIBASIC 236; 224 MG/ML; MG/ML
15 INJECTION, SOLUTION INTRAVENOUS ONCE
Refills: 0 | Status: COMPLETED | OUTPATIENT
Start: 2024-05-22 | End: 2024-05-22

## 2024-05-22 RX ORDER — METRONIDAZOLE 500 MG
1 TABLET ORAL
Qty: 6 | Refills: 0
Start: 2024-05-22 | End: 2024-05-23

## 2024-05-22 RX ORDER — CIPROFLOXACIN LACTATE 400MG/40ML
1 VIAL (ML) INTRAVENOUS
Qty: 4 | Refills: 0
Start: 2024-05-22 | End: 2024-05-23

## 2024-05-22 RX ORDER — SODIUM,POTASSIUM PHOSPHATES 278-250MG
1 POWDER IN PACKET (EA) ORAL ONCE
Refills: 0 | Status: DISCONTINUED | OUTPATIENT
Start: 2024-05-22 | End: 2024-05-22

## 2024-05-22 RX ADMIN — Medication 200 MILLIGRAM(S): at 06:39

## 2024-05-22 RX ADMIN — PANTOPRAZOLE SODIUM 40 MILLIGRAM(S): 20 TABLET, DELAYED RELEASE ORAL at 05:15

## 2024-05-22 RX ADMIN — Medication 200 MILLIGRAM(S): at 17:39

## 2024-05-22 RX ADMIN — Medication 200 MILLIGRAM(S): at 05:15

## 2024-05-22 RX ADMIN — Medication 100 MILLIGRAM(S): at 05:15

## 2024-05-22 RX ADMIN — POTASSIUM PHOSPHATE, MONOBASIC POTASSIUM PHOSPHATE, DIBASIC 62.5 MILLIMOLE(S): 236; 224 INJECTION, SOLUTION INTRAVENOUS at 10:15

## 2024-05-22 RX ADMIN — Medication 100 MILLIGRAM(S): at 13:36

## 2024-05-22 RX ADMIN — ENOXAPARIN SODIUM 40 MILLIGRAM(S): 100 INJECTION SUBCUTANEOUS at 11:26

## 2024-05-22 NOTE — PROGRESS NOTE ADULT - PROBLEM SELECTOR PLAN 1
- Presented with Nausea, vomiting, diarrhea, Likely 2/2 GI Source  - Meets Sepsis criteria with tachycardia, Leukocytosis 15k and  Tmax 100F  - Lactate 2.9>1  - S/P 4L IVF and Cipro + Flagyl In ED  - UA Negative   -CT abd showing colonic diverticulosis w/ no abscess  - c/w IV Fluids   - c/w IV Cipro and Flagyl  - F/U Stool Cultures, GI PRC, Stool O&P  - F/U Blood Cultures  -GI PCR showing ETEC +
- Presented with Nausea, vomiting, diarrhea, Likely 2/2 GI Source  - Meets Sepsis criteria with tachycardia, Leukocytosis 15k and  Tmax 100F  - Lactate 2.9>1  - S/P 4L IVF and Cipro + Flagyl In ED  -CT abd showing colonic diverticulosis w/ no abscess  -GI PCR showing ETEC +, and STEC  -blood cx NGTD  - c/w IV Fluids   - c/w IV Cipro and Flagyl  - F/U Stool Cultures, GI PRC, Stool O&P  -ID  on case

## 2024-05-22 NOTE — PROGRESS NOTE ADULT - NEGATIVE CARDIOVASCULAR SYMPTOMS
no chest pain/no palpitations/no dyspnea on exertion/no orthopnea/no peripheral edema
no chest pain/no palpitations/no dyspnea on exertion/no orthopnea/no peripheral edema

## 2024-05-22 NOTE — DISCHARGE NOTE PROVIDER - HOSPITAL COURSE
Patient is a 64 year old female from home, ambulates independently, with PMH of SLE, arthritis, and depression who presented to the ED with nausea, vomiting, and diarrhea for the past 2 days.  Patient states her symptoms started yesterday around noon after eating shrimp prior.  Patient states she had several episodes of non bloody watery diarrhea as well as vomiting episodes. In the ED patient was noted to be septic, with a WBC of 15k, CT abdomnen showed diverticolosis with no abscess. GI PCR and ETEC. Pateint was started on IV abx and while being in the hospital patient did not develop any more loose bowels, was given IV hydration. Patient to go home on oral abx for 5 days and follow up with GI outpatient.    Patient is a 64 year old female from home, ambulates independently, with PMH of SLE, arthritis, and depression who presented to the ED with nausea, vomiting, and diarrhea for the past 2 days.  Patient states her symptoms started yesterday around noon after eating shrimp prior.  Patient states she had several episodes of non bloody watery diarrhea as well as vomiting episodes. In the ED patient was noted to be septic, with a WBC of 15k, CT abdomnen showed diverticolosis with no abscess. GI PCR and ETEC. Pateint was started on IV abx and while being in the hospital patient did not develop any more loose bowels, was given IV hydration. Patient to go home on oral abx for 5 days and follow up with GI outpatient. Patient to go home on cipro 500mg q12 and flagyl 500mg q8 for 2 more days     Patient is stable for discharge per attending and is advised to follow up with PCP as outpatient. Please refer to patient's complete medical chart with documents for a full hospital course, for this is only a brief summary.     Patient is a 64 year old female from home, ambulates independently, with PMH of SLE, arthritis, and depression who presented to the ED with nausea, vomiting, and diarrhea for the past 2 days.  Patient states her symptoms started yesterday around noon after eating shrimp prior.  Patient states she had several episodes of non bloody watery diarrhea as well as vomiting episodes. In the ED patient was noted to be septic, with a WBC of 15k, CT abdomen showed diverticulosis with no abscess. GI PCR was positive for ETEC, EAEC and Shiga toxic producing E.coli. Patient was started on IV Cipro/Flagyl regimen. While being in the hospital patient did not develop any more loose bowels, was given IV hydration. Patient to go home on oral abx for 5 days and follow up with GI outpatient. Patient to go home on cipro 500mg q12 and flagyl 500mg q8 for 2 more days     Patient is stable for discharge per attending and is advised to follow up with PCP as outpatient. Please refer to patient's complete medical chart with documents for a full hospital course, for this is only a brief summary.

## 2024-05-22 NOTE — PROGRESS NOTE ADULT - ASSESSMENT
1. Abdominal pain  2. Diarrhea with nausea and vomiting  3. Infectious gastroenteritis  4. Colitis  5. Anemia (etiology multifactorial)  6. no evidence of GI bleeding at present time    Suggestions:    1. ID follow up  2. Continue antibiotics as per ID  3. Monitor electrolytes  4. IVF hydration  5. Diet as tolerated  6. Colonoscopy in 6-8 weeks out patient  7. Protonix 40mg daily  8. Avoid NSAID  9. Monitor H/H  10. Transfuse PRBC as needed  11. DVT prophylaxis

## 2024-05-22 NOTE — PROGRESS NOTE ADULT - NEGATIVE GASTROINTESTINAL SYMPTOMS
no constipation/no melena/no hematochezia/no steatorrhea/no jaundice/no hiccoughs
no constipation/no melena/no hematochezia/no steatorrhea/no jaundice/no hiccoughs

## 2024-05-22 NOTE — PROGRESS NOTE ADULT - SUBJECTIVE AND OBJECTIVE BOX
PGY-1 Progress Note discussed with attending    PAGER #: [[phone]] TILL 5:00 PM  PLEASE CONTACT ON CALL TEAM:  - On Call Team (Please refer to Aleta) FROM 5:00 PM - 8:30PM  - Nightfloat Team FROM 8:30 -7:30 AM    INTERVAL HPI/OVERNIGHT EVENTS: No acute events overnight.  Patient examined at bedside this AM.  Patient denies acute complaints and denies recent bouts of diarrhea. Has made solid stools.       REVIEW OF SYSTEMS:  CONSTITUTIONAL: No fever, weight loss, or fatigue  RESPIRATORY: No cough, wheezing, chills or hemoptysis; No shortness of breath  CARDIOVASCULAR: No chest pain, palpitations, dizziness, or leg swelling  GASTROINTESTINAL: No abdominal pain. No nausea, vomiting, or hematemesis; No diarrhea or constipation. No melena or hematochezia.  GENITOURINARY: No dysuria or hematuria, urinary frequency  NEUROLOGICAL: No headaches, memory loss, loss of strength, numbness, or tremors  SKIN: No itching, burning, rashes, or lesions     Vital Signs Last 24 Hrs  T(C): 36.6 (22 May 2024 04:48), Max: 36.8 (21 May 2024 13:10)  T(F): 97.9 (22 May 2024 04:48), Max: 98.2 (21 May 2024 13:10)  HR: 74 (22 May 2024 04:48) (74 - 84)  BP: 129/80 (22 May 2024 04:48) (122/81 - 136/83)  BP(mean): --  RR: 18 (22 May 2024 04:48) (18 - 18)  SpO2: 97% (22 May 2024 04:48) (97% - 100%)    Parameters below as of 22 May 2024 04:48  Patient On (Oxygen Delivery Method): room air        PHYSICAL EXAMINATION:  GENERAL: NAD,   HEAD:  Atraumatic, Normocephalic  EYES:  conjunctiva and sclera clear  NECK: Supple,   CHEST/LUNG: Clear to auscultation. Clear to percussion bilaterally; No rales, rhonchi, wheezing, or rubs  HEART: Regular rate and rhythm; No murmurs, rubs, or gallops  ABDOMEN: Soft, Nontender, Nondistended; Bowel sounds present  NERVOUS SYSTEM:  Alert & Oriented X3,    EXTREMITIES:  2+ Peripheral Pulses, No clubbing, cyanosis, or edema  SKIN: warm dry                          10.6   8.95  )-----------( 217      ( 22 May 2024 05:23 )             32.7     05-22    145  |  116<H>  |  3<L>  ----------------------------<  93  4.0   |  26  |  0.47<L>    Ca    8.0<L>      22 May 2024 05:23  Phos  1.8     05-22  Mg     2.3     05-22    TPro  6.0  /  Alb  3.0<L>  /  TBili  0.6  /  DBili  x   /  AST  47<H>  /  ALT  37  /  AlkPhos  75  05-22    LIVER FUNCTIONS - ( 22 May 2024 05:23 )  Alb: 3.0 g/dL / Pro: 6.0 g/dL / ALK PHOS: 75 U/L / ALT: 37 U/L DA / AST: 47 U/L / GGT: x                   CAPILLARY BLOOD GLUCOSE      RADIOLOGY & ADDITIONAL TESTS:

## 2024-05-22 NOTE — PROGRESS NOTE ADULT - PROVIDER SPECIALTY LIST ADULT
Infectious Disease
Internal Medicine
Internal Medicine
Gastroenterology
Internal Medicine
Internal Medicine
Gastroenterology

## 2024-05-22 NOTE — DISCHARGE NOTE PROVIDER - CARE PROVIDER_API CALL
Abhilash Garay  Gastroenterology  [facility], [address]  [place] [address]  Phone: [phone]  Fax: [phone]  Follow Up Time:     Eliot De Leon L  Pulmonary [address] [address]  Phone: [phone]  Fax: [phone]  Follow Up Time:

## 2024-05-22 NOTE — DISCHARGE NOTE PROVIDER - NSDCCPCAREPLAN_GEN_ALL_CORE_FT
PRINCIPAL DISCHARGE DIAGNOSIS  Diagnosis: Acute gastroenteritis  Assessment and Plan of Treatment: You presented to the hospital with abdominal pain, loose bowel movements. In the ED you had a fever and elevated white count. An abdominal CT was done which showed diverticulosis with no abscess. You were started on IV antibiotics and IV hydration. An ID specialist evaluated you. You are going home on ciprofloxacin and flagyl at home and complete 5 days. You are going to take home Ciprofloxacin 500 mg every 12 hours for 2 days and Metronidazole 500mg every 8 hours for 2 more days. Please follow up with your PCP and gastroenterologist for further manegment. The gastroenterologist that evaluated you in the hospital did recommend for you to get a colonoscopy in 6-8 weeks.      SECONDARY DISCHARGE DIAGNOSES  Diagnosis: Lupus  Assessment and Plan of Treatment: You have a history of lupus and while being in the hospital you were given your home medications and did not develop any complications from this. Please follow up with your PCP and specialist for further managment.     PRINCIPAL DISCHARGE DIAGNOSIS  Diagnosis: Acute gastroenteritis  Assessment and Plan of Treatment: You presented to the hospital with abdominal pain, loose bowel movements. In the ED you had a fever and elevated white count. An abdominal CT was done which showed diverticulosis with no abscess. You were started on IV antibiotics and IV hydration. An ID specialist evaluated you. As per infectious disease recommendations you are being discharged on Ciprofloxacin 500 mg every 12 hours for 2 days and Metronidazole 500mg every 8 hours for 2 more days to complete 5 days of antibiotic treatment. Please follow up with your PCP and gastroenterologist for further manegment. The gastroenterologist that evaluated you in the hospital did recommend for you to get a colonoscopy in 6-8 weeks.      SECONDARY DISCHARGE DIAGNOSES  Diagnosis: Lupus  Assessment and Plan of Treatment: You have a history of lupus and while being in the hospital you were given your home medications and did not develop any complications from this. Please follow up with your PCP and specialist for further managment.

## 2024-05-22 NOTE — PROGRESS NOTE ADULT - SUBJECTIVE AND OBJECTIVE BOX
Patient is seen and examined at the bed side, is afebrile. She is doing better, diarrhea resolved. The Blood cultures remains negative to date and Leukocytosis trended down to normal.        REVIEW OF SYSTEMS: All other review systems are negative        ALLERGIES: No Known Allergies      Vital Signs Last 24 Hrs  T(C): 36.8 (22 May 2024 13:24), Max: 36.8 (21 May 2024 20:58)  T(F): 98.2 (22 May 2024 13:24), Max: 98.2 (21 May 2024 20:58)  HR: 92 (22 May 2024 13:24) (74 - 92)  BP: 120/75 (22 May 2024 13:24) (120/75 - 136/83)  BP(mean): --  RR: 18 (22 May 2024 13:24) (18 - 18)  SpO2: 96% (22 May 2024 13:24) (96% - 98%)    Parameters below as of 22 May 2024 13:24  Patient On (Oxygen Delivery Method): room air        PHYSICAL EXAM:  GENERAL: Not in distress   CHEST/LUNG: Not using accessory muscles   HEART: s1 and s2 present  ABDOMEN:  Nontender and  Nondistended  EXTREMITIES: No pedal  edema  CNS: Awake and Alert      LABS:                        10.6   8.95  )-----------( 217      ( 22 May 2024 05:23 )             32.7                           9.9    13.09 )-----------( 195      ( 21 May 2024 05:11 )             30.7         05-22    145  |  116<H>  |  3<L>  ----------------------------<  93  4.0   |  26  |  0.47<L>    Ca    8.0<L>      22 May 2024 05:23  Phos  1.8     05-22  Mg     2.3     05-22    TPro  6.0  /  Alb  3.0<L>  /  TBili  0.6  /  DBili  x   /  AST  47<H>  /  ALT  37  /  AlkPhos  75  05-22 05-21    144  |  118<H>  |  6<L>  ----------------------------<  90  3.9   |  25  |  0.49<L>    Ca    7.6<L>      21 May 2024 05:11  Phos  1.0     05-21  Mg     2.2     05-21    TPro  5.4<L>  /  Alb  2.8<L>  /  TBili  0.8  /  DBili  0.2  /  AST  36  /  ALT  30  /  AlkPhos  56  05-21      MEDICATIONS  (STANDING):    ciprofloxacin   IVPB 400 milliGRAM(s) IV Intermittent every 12 hours  enoxaparin Injectable 40 milliGRAM(s) SubCutaneous every 24 hours  hydroxychloroquine 200 milliGRAM(s) Oral two times a day  lactated ringers. 1000 milliLiter(s) (75 mL/Hr) IV Continuous <Continuous>  metroNIDAZOLE  IVPB      metroNIDAZOLE  IVPB 500 milliGRAM(s) IV Intermittent every 8 hours  pantoprazole    Tablet 40 milliGRAM(s) Oral before breakfast        RADIOLOGY & ADDITIONAL TESTS:    5/20/24 : CT Abdomen and Pelvis w/ IV Cont (05.20.24 @ 10:00) Colonic diverticulosis. Apparent short segment mural thickening at the   distal sigmoid colon may represent a nonspecific colitis, or diverticulitis. Clinical correlation is recommended. No associated   abscess is identified. Follow-up colonoscopy after treatment/resolution of acute disease may be pursued to rule out colon cancer.    Trace pelvic ascites.    No CT evidence for gallstones, or thickened gallbladder wall. Possible trace pericholecystic fluid. If clinically indicated, gallbladder   ultrasound may be pursued for further evaluation.      5/20/24 : Xray Chest 1 View-PORTABLE IMMEDIATE (Xray Chest 1 View-PORTABLE IMMEDIATE .) (05.20.24 @ 01:08) >  IMPRESSION: No acute cardiopulmonary disease process.        MICROBIOLOGY DATA:    Ova and Parasites (05.20.24 @ 21:40)   Specimen Source: .Stool Feces  Culture Results:   No Protozoa seen by trichrome stain   No Helminths or Protozoa seen in formalin concentrate   performed by iodine stain   (routine O+P not evaluated for Microsporidia,   Cryptosporidia or Cyclospora)   One negative sample does not necessarily rule   out the presence of a parasitic infection.    Culture - Stool (05.20.24 @ 21:40)   Specimen Source: .Stool Feces  Culture Results:   No enteric pathogens to date: Final culture pending        GI PCR Panel Stool (05.20.24 @ 21:40)   GI PCR Panel: Detected:   For culture and susceptibility reports refer to "reflex stool culture".  Enteroaggregative E. coli (EAEC): Detected  Enterotoxigenic E.coli (ETEC) lt/st: Detected  Shiga-like toxin-producing E.coli (STEC) stx1/stx2: Detected    Culture - Blood (05.20.24 @ 05:48)   Specimen Source: .Blood Blood-Peripheral  Culture Results: No growth at 48 hours      Culture - Blood (05.20.24 @ 05:32)   Specimen Source: .Blood Blood-Peripheral  Culture Results: No growth at 48 ours             Patient is seen and examined at the bed side, is afebrile. She is doing better, diarrhea resolved. The Blood cultures remains negative to date and Leukocytosis trended down to normal.      REVIEW OF SYSTEMS: All other review systems are negative      ALLERGIES: No Known Allergies      Vital Signs Last 24 Hrs  T(C): 36.8 (22 May 2024 13:24), Max: 36.8 (21 May 2024 20:58)  T(F): 98.2 (22 May 2024 13:24), Max: 98.2 (21 May 2024 20:58)  HR: 92 (22 May 2024 13:24) (74 - 92)  BP: 120/75 (22 May 2024 13:24) (120/75 - 136/83)  BP(mean): --  RR: 18 (22 May 2024 13:24) (18 - 18)  SpO2: 96% (22 May 2024 13:24) (96% - 98%)    Parameters below as of 22 May 2024 13:24  Patient On (Oxygen Delivery Method): room air      PHYSICAL EXAM:  GENERAL: Not in distress   CHEST/LUNG: Not using accessory muscles   HEART: s1 and s2 present  ABDOMEN:  Nontender and  Nondistended  EXTREMITIES: No pedal  edema  CNS: Awake and Alert      LABS:                        10.6   8.95  )-----------( 217      ( 22 May 2024 05:23 )             32.7                           9.9    13.09 )-----------( 195      ( 21 May 2024 05:11 )             30.7         05-22    145  |  116<H>  |  3<L>  ----------------------------<  93  4.0   |  26  |  0.47<L>    Ca    8.0<L>      22 May 2024 05:23  Phos  1.8     05-22  Mg     2.3     05-22    TPro  6.0  /  Alb  3.0<L>  /  TBili  0.6  /  DBili  x   /  AST  47<H>  /  ALT  37  /  AlkPhos  75  05-22 05-21    144  |  118<H>  |  6<L>  ----------------------------<  90  3.9   |  25  |  0.49<L>    Ca    7.6<L>      21 May 2024 05:11  Phos  1.0     05-21  Mg     2.2     05-21    TPro  5.4<L>  /  Alb  2.8<L>  /  TBili  0.8  /  DBili  0.2  /  AST  36  /  ALT  30  /  AlkPhos  56  05-21      MEDICATIONS  (STANDING):    ciprofloxacin   IVPB 400 milliGRAM(s) IV Intermittent every 12 hours  enoxaparin Injectable 40 milliGRAM(s) SubCutaneous every 24 hours  hydroxychloroquine 200 milliGRAM(s) Oral two times a day  lactated ringers. 1000 milliLiter(s) (75 mL/Hr) IV Continuous <Continuous>  metroNIDAZOLE  IVPB      metroNIDAZOLE  IVPB 500 milliGRAM(s) IV Intermittent every 8 hours  pantoprazole    Tablet 40 milliGRAM(s) Oral before breakfast        RADIOLOGY & ADDITIONAL TESTS:    5/20/24 : CT Abdomen and Pelvis w/ IV Cont (05.20.24 @ 10:00) Colonic diverticulosis. Apparent short segment mural thickening at the   distal sigmoid colon may represent a nonspecific colitis, or diverticulitis. Clinical correlation is recommended. No associated   abscess is identified. Follow-up colonoscopy after treatment/resolution of acute disease may be pursued to rule out colon cancer.    Trace pelvic ascites.    No CT evidence for gallstones, or thickened gallbladder wall. Possible trace pericholecystic fluid. If clinically indicated, gallbladder   ultrasound may be pursued for further evaluation.      5/20/24 : Xray Chest 1 View-PORTABLE IMMEDIATE (Xray Chest 1 View-PORTABLE IMMEDIATE .) (05.20.24 @ 01:08) >  IMPRESSION: No acute cardiopulmonary disease process.        MICROBIOLOGY DATA:    Ova and Parasites (05.20.24 @ 21:40)   Specimen Source: .Stool Feces  Culture Results:   No Protozoa seen by trichrome stain   No Helminths or Protozoa seen in formalin concentrate   performed by iodine stain   (routine O+P not evaluated for Microsporidia,   Cryptosporidia or Cyclospora)   One negative sample does not necessarily rule   out the presence of a parasitic infection.    Culture - Stool (05.20.24 @ 21:40)   Specimen Source: .Stool Feces  Culture Results:   No enteric pathogens to date: Final culture pending        GI PCR Panel Stool (05.20.24 @ 21:40)   GI PCR Panel: Detected:   For culture and susceptibility reports refer to "reflex stool culture".  Enteroaggregative E. coli (EAEC): Detected  Enterotoxigenic E.coli (ETEC) lt/st: Detected  Shiga-like toxin-producing E.coli (STEC) stx1/stx2: Detected    Culture - Blood (05.20.24 @ 05:48)   Specimen Source: .Blood Blood-Peripheral  Culture Results: No growth at 48 hours      Culture - Blood (05.20.24 @ 05:32)   Specimen Source: .Blood Blood-Peripheral  Culture Results: No growth at 48 ours

## 2024-05-22 NOTE — PROGRESS NOTE ADULT - NEGATIVE GENERAL GENITOURINARY SYMPTOMS
no hematuria/no renal colic/no flank pain L/no flank pain R/no urine discoloration/no gas in urine/no incontinence/no dysuria
no hematuria/no renal colic/no flank pain L/no flank pain R/no urine discoloration/no gas in urine/no incontinence/no dysuria

## 2024-05-22 NOTE — PROGRESS NOTE ADULT - NEGATIVE OPHTHALMOLOGIC SYMPTOMS
no diplopia/no photophobia/no lacrimation L/no lacrimation R/no blurred vision L/no blurred vision R/no discharge L/no discharge R/no pain L/no pain R/no irritation L/no irritation R/no scleral injection L/no scleral injection R
no diplopia/no photophobia/no lacrimation L/no lacrimation R/no blurred vision L/no blurred vision R/no discharge L/no discharge R/no pain L/no pain R/no irritation L/no irritation R/no scleral injection L/no scleral injection R

## 2024-05-22 NOTE — DISCHARGE NOTE PROVIDER - CARE PROVIDERS DIRECT ADDRESSES
,DirectAddress_Unknown,[email or web address]

## 2024-05-22 NOTE — DISCHARGE NOTE NURSING/CASE MANAGEMENT/SOCIAL WORK - PATIENT PORTAL LINK FT
You can access the FollowMyHealth Patient Portal offered by [facility] by registering at the following website: [email or web address]. By joining [facility]’s FollowMyHealth portal, you will also be able to view your health information using other applications (apps) compatible with our system.

## 2024-05-22 NOTE — PROGRESS NOTE ADULT - SUBJECTIVE AND OBJECTIVE BOX
Patient is a 64y old  Female who presents with a chief complaint of Severe Sepsis 2/2 likely colitis vs diverticulitis (21 May 2024 19:05)    pt seen in icu [  ], reg med floor [ x  ], bed [ x ], chair at bedside [   ], a+o x3 [x ], lethargic [  ],    nad [ x ]        Allergies    No Known Allergies        Vitals    T(F): 97.9 (05-22-24 @ 04:48), Max: 98.2 (05-21-24 @ 13:10)  HR: 74 (05-22-24 @ 04:48) (74 - 84)  BP: 129/80 (05-22-24 @ 04:48) (122/81 - 136/83)  RR: 18 (05-22-24 @ 04:48) (18 - 18)  SpO2: 97% (05-22-24 @ 04:48) (97% - 100%)  Wt(kg): --  CAPILLARY BLOOD GLUCOSE          Labs                          9.9    13.09 )-----------( 195      ( 21 May 2024 05:11 )             30.7       05-21    144  |  118<H>  |  6<L>  ----------------------------<  90  3.9   |  25  |  0.49<L>    Ca    7.6<L>      21 May 2024 05:11  Phos  1.0     05-21  Mg     2.2     05-21    TPro  5.4<L>  /  Alb  2.8<L>  /  TBili  0.8  /  DBili  0.2  /  AST  36  /  ALT  30  /  AlkPhos  56  05-21            Clean Catch Clean Catch (Midstream)  05-20 @ 22:30   No growth  --  --      .Stool Feces  05-20 @ 21:40   No enteric pathogens to date: Final culture pending  --  --      .Blood Blood-Peripheral  05-20 @ 05:48   No growth at 24 hours  --  --      .Blood Blood-Peripheral  05-20 @ 05:32   No growth at 24 hours  --  --          Radiology Results      Meds    MEDICATIONS  (STANDING):  ciprofloxacin   IVPB 400 milliGRAM(s) IV Intermittent every 12 hours  enoxaparin Injectable 40 milliGRAM(s) SubCutaneous every 24 hours  hydroxychloroquine 200 milliGRAM(s) Oral two times a day  lactated ringers. 1000 milliLiter(s) (75 mL/Hr) IV Continuous <Continuous>  metroNIDAZOLE  IVPB      metroNIDAZOLE  IVPB 500 milliGRAM(s) IV Intermittent every 8 hours  pantoprazole    Tablet 40 milliGRAM(s) Oral before breakfast      MEDICATIONS  (PRN):  acetaminophen     Tablet .. 650 milliGRAM(s) Oral every 6 hours PRN Temp greater or equal to 38C (100.4F), Mild Pain (1 - 3)  aluminum hydroxide/magnesium hydroxide/simethicone Suspension 30 milliLiter(s) Oral every 4 hours PRN Dyspepsia  melatonin 3 milliGRAM(s) Oral at bedtime PRN Insomnia  ondansetron Injectable 4 milliGRAM(s) IV Push every 8 hours PRN Nausea and/or Vomiting  traMADol 50 milliGRAM(s) Oral three times a day PRN Moderate Pain (4 - 6)      Physical Exam    Neuro :  no focal deficits  Respiratory: CTA B/L  CV: RRR, S1S2, no murmurs,   Abdominal: Soft, NT, ND +BS,  Extremities: No edema, + peripheral pulses    ASSESSMENT    acute gastroenterocolitis,   sepsis,   h/o SLE,   arthritis,   depression        PLAN    cont cipro, flagyl,   stool pcr with Enteroaggregative E. coli (EAEC):, Enterotoxigenic E.coli (ETEC) lt/st:  Shiga-like toxin-producing E.coli (STEC) stx1/stx2: noted above   id cons   ct abd pelv with Colonic diverticulosis. Apparent short segment mural thickening at the   distal sigmoid colon may represent a nonspecific colitis, or   diverticulitis. Clinical correlation is recommended. No associated   abscess is identified. Follow-up colonoscopy after treatment/resolution   of acute disease may be pursued to rule out colon cancer. Trace pelvic ascites.  No CT evidence for gallstones, or thickened gallbladder wall. Possible   trace pericholecystic fluid. If clinically indicated, gallbladder   ultrasound may be pursued for further evaluation noted above.     f/u cea   diarrhea resolved  gi f/u   IVF hydration  adv Diet as tolerated  Protonix 40mg daily  Avoid NSAID  cont current meds         Patient is a 64y old  Female who presents with a chief complaint of Severe Sepsis 2/2 likely colitis vs diverticulitis (21 May 2024 19:05)    pt seen in icu [  ], reg med floor [ x  ], bed [ x ], chair at bedside [   ], a+o x3 [x ], lethargic [  ],    nad [ x ]        Allergies    No Known Allergies        Vitals    T(F): 97.9 (05-22-24 @ 04:48), Max: 98.2 (05-21-24 @ 13:10)  HR: 74 (05-22-24 @ 04:48) (74 - 84)  BP: 129/80 (05-22-24 @ 04:48) (122/81 - 136/83)  RR: 18 (05-22-24 @ 04:48) (18 - 18)  SpO2: 97% (05-22-24 @ 04:48) (97% - 100%)  Wt(kg): --  CAPILLARY BLOOD GLUCOSE          Labs                          9.9    13.09 )-----------( 195      ( 21 May 2024 05:11 )             30.7       05-21    144  |  118<H>  |  6<L>  ----------------------------<  90  3.9   |  25  |  0.49<L>    Ca    7.6<L>      21 May 2024 05:11  Phos  1.0     05-21  Mg     2.2     05-21    TPro  5.4<L>  /  Alb  2.8<L>  /  TBili  0.8  /  DBili  0.2  /  AST  36  /  ALT  30  /  AlkPhos  56  05-21        Carcinoembryonic Antigen (05.21.24 @ 12:28)   Carcinoembryonic Antigen: 1.6:    Clean Catch Clean Catch (Midstream)  05-20 @ 22:30   No growth  --  --      .Stool Feces  05-20 @ 21:40   No enteric pathogens to date: Final culture pending  --  --      .Blood Blood-Peripheral  05-20 @ 05:48   No growth at 24 hours  --  --      .Blood Blood-Peripheral  05-20 @ 05:32   No growth at 24 hours  --  --          Radiology Results      Meds    MEDICATIONS  (STANDING):  ciprofloxacin   IVPB 400 milliGRAM(s) IV Intermittent every 12 hours  enoxaparin Injectable 40 milliGRAM(s) SubCutaneous every 24 hours  hydroxychloroquine 200 milliGRAM(s) Oral two times a day  lactated ringers. 1000 milliLiter(s) (75 mL/Hr) IV Continuous <Continuous>  metroNIDAZOLE  IVPB      metroNIDAZOLE  IVPB 500 milliGRAM(s) IV Intermittent every 8 hours  pantoprazole    Tablet 40 milliGRAM(s) Oral before breakfast      MEDICATIONS  (PRN):  acetaminophen     Tablet .. 650 milliGRAM(s) Oral every 6 hours PRN Temp greater or equal to 38C (100.4F), Mild Pain (1 - 3)  aluminum hydroxide/magnesium hydroxide/simethicone Suspension 30 milliLiter(s) Oral every 4 hours PRN Dyspepsia  melatonin 3 milliGRAM(s) Oral at bedtime PRN Insomnia  ondansetron Injectable 4 milliGRAM(s) IV Push every 8 hours PRN Nausea and/or Vomiting  traMADol 50 milliGRAM(s) Oral three times a day PRN Moderate Pain (4 - 6)      Physical Exam    Neuro :  no focal deficits  Respiratory: CTA B/L  CV: RRR, S1S2, no murmurs,   Abdominal: Soft, NT, ND +BS,  Extremities: No edema, + peripheral pulses    ASSESSMENT    acute gastroenterocolitis,   sepsis,   h/o SLE,   arthritis,   depression        PLAN    stool pcr with Enteroaggregative E. coli (EAEC):, Enterotoxigenic E.coli (ETEC) lt/st:  Shiga-like toxin-producing E.coli (STEC) stx1/stx2: noted above   id f/u    Continue Cipro and flagyl for now  ct abd pelv with Colonic diverticulosis. Apparent short segment mural thickening at the   distal sigmoid colon may represent a nonspecific colitis, or   diverticulitis. Clinical correlation is recommended. No associated   abscess is identified. Follow-up colonoscopy after treatment/resolution   of acute disease may be pursued to rule out colon cancer. Trace pelvic ascites.  No CT evidence for gallstones, or thickened gallbladder wall. Possible   trace pericholecystic fluid. If clinically indicated, gallbladder   ultrasound may be pursued for further evaluation noted       cea neg noted above  diarrhea resolved  gi f/u   adv Diet as tolerated  Protonix 40mg daily  Avoid NSAID   colonoscopy in 6-8 weeks out patient  cont current meds  d/c plan if id clear

## 2024-05-22 NOTE — PROGRESS NOTE ADULT - SUBJECTIVE AND OBJECTIVE BOX
[   ] ICU                                          [   ] CCU                                      [  X ] Medical Floor    Patient is a 64 year old female with abdominal pain, diarrhea, nausea and vomiting. GI consulted to evaluate.          Patient is a 64 year old female from home, ambulates independently, with past medical history significant for SLE, arthritis, and depression who presented to the emergency room with 2 days history of progressively worsening watery non bloody diarrhea associated with nausea, non bloody vomiting and intermittent diffuse 5-6/10 intensity non radiating crampy abdominal pain. Patient denies hematemesis, hematochezia, melena, fever, chills, chest pain, SOB, cough, hematuria, dysuria, recent traveling or antibiotics intake.    Patient appears comfortable. Patient with diarrhea other wise no new complaints reported, No abdominal pain, N/V, hematemesis, hematochezia, melena, fever, chills, chest pain, SOB, cough reported.           PAST MEDICAL HISTORY    SLE (systemic lupus erythematosus)    Arthritis    Major depression        PAST SURGICAL HISTORY    Hysterectomy    Breast surgery        Allergies    No Known Allergies    Intolerances  None          SOCIAL HISTORY  Advanced Directives:       [ X ] Full Code       [  ] DNR  Marital Status:         [  ] M      [ X ] S      [  ] D       [  ] W  Children:       [X  ] Yes      [  ] No  Occupation:        [  ] Employed       [ X ] Unemployed       [  ] Retired  Diet:       [X  ] Regular       [  ] PEG feeding          [  ] NG tube feeding  Drug Use:           [ X ] Patient denied          [  ] Yes  Alcohol:           [ X ] No             [  ] Yes (socially)         [  ] Yes (chronic)  Tobacco:           [  ] Yes           [ X ] No      FAMILY HISTORY  [ X ] Heart Disease            [ X ] Diabetes             [ X ] HTN             [  ] Colon Cancer             [  ] Stomach Cancer              [  ] Pancreatic Cancer        VITALS   Vital Signs Last 24 Hrs  T(C): 36.6 (05-22-24 @ 04:48), Max: 36.8 (05-21-24 @ 13:10)  T(F): 97.9 (05-22-24 @ 04:48), Max: 98.2 (05-21-24 @ 13:10)  HR: 74 (05-22-24 @ 04:48) (74 - 84)  BP: 129/80 (05-22-24 @ 04:48) (122/81 - 136/83)   RR: 18 (05-22-24 @ 04:48) (18 - 18)  SpO2: 97% (05-22-24 @ 04:48) (97% - 100%)      MEDICATIONS  (STANDING):  ciprofloxacin   IVPB 400 milliGRAM(s) IV Intermittent every 12 hours  enoxaparin Injectable 40 milliGRAM(s) SubCutaneous every 24 hours  hydroxychloroquine 200 milliGRAM(s) Oral two times a day  lactated ringers. 1000 milliLiter(s) (75 mL/Hr) IV Continuous <Continuous>  metroNIDAZOLE  IVPB      metroNIDAZOLE  IVPB 500 milliGRAM(s) IV Intermittent every 8 hours  pantoprazole    Tablet 40 milliGRAM(s) Oral before breakfast  potassium phosphate IVPB 15 milliMole(s) IV Intermittent once    MEDICATIONS  (PRN):  acetaminophen     Tablet .. 650 milliGRAM(s) Oral every 6 hours PRN Temp greater or equal to 38C (100.4F), Mild Pain (1 - 3)  aluminum hydroxide/magnesium hydroxide/simethicone Suspension 30 milliLiter(s) Oral every 4 hours PRN Dyspepsia  melatonin 3 milliGRAM(s) Oral at bedtime PRN Insomnia  ondansetron Injectable 4 milliGRAM(s) IV Push every 8 hours PRN Nausea and/or Vomiting  traMADol 50 milliGRAM(s) Oral three times a day PRN Moderate Pain (4 - 6)                            10.6   8.95  )-----------( 217      ( 22 May 2024 05:23 )             32.7       05-22    145  |  116<H>  |  3<L>  ----------------------------<  93  4.0   |  26  |  0.47<L>    Ca    8.0<L>      22 May 2024 05:23  Phos  1.8     05-22  Mg     2.3     05-22    TPro  6.0  /  Alb  3.0<L>  /  TBili  0.6  /  DBili  x   /  AST  47<H>  /  ALT  37  /  AlkPhos  75  05-22

## 2024-05-22 NOTE — PROGRESS NOTE ADULT - RESPIRATORY
clear to auscultation bilaterally/no wheezes/no rales/no rhonchi/no respiratory distress/no use of accessory muscles/airway patent/breath sounds equal
clear to auscultation bilaterally/no wheezes/no rales/no rhonchi/no respiratory distress/no use of accessory muscles/airway patent/breath sounds equal

## 2024-05-22 NOTE — PROGRESS NOTE ADULT - NEGATIVE ENMT SYMPTOMS
no hearing difficulty/no ear pain/no tinnitus/no vertigo/no nasal congestion/no nasal discharge/no nasal obstruction/no nose bleeds/no gum bleeding/no dry mouth/no throat pain/no dysphagia
no hearing difficulty/no ear pain/no tinnitus/no vertigo/no nasal congestion/no nasal discharge/no nasal obstruction/no nose bleeds/no gum bleeding/no dry mouth/no throat pain/no dysphagia

## 2024-05-22 NOTE — DISCHARGE NOTE PROVIDER - ATTENDING ATTESTATION STATEMENT
I have personally seen and examined the patient. I have collaborated with and supervised the forgetfull

## 2024-05-22 NOTE — PROGRESS NOTE ADULT - REASON FOR ADMISSION
Severe Sepsis 2/2 likely colitis vs diverticulitis

## 2024-05-22 NOTE — PROGRESS NOTE ADULT - PROBLEM SELECTOR PLAN 3
- History of Lupus  - Continue home Hydroxychlorquine and hold Leflunomide in setting of infection
- History of Lupus  - Continue home Hydroxychlorquine and hold Leflunomide in setting of infection

## 2024-05-22 NOTE — PROGRESS NOTE ADULT - ASSESSMENT
Patient is a 64y old  Female who is from home, ambulates independently, with PMH of SLE, arthritis, and depression, now presents to the ER for evaluation of nausea, vomiting, and diarrhea for the past 2 days.  Patient states her symptoms started yesterday around noon after eating shrimp prior.  Patient  feels very dehydrated, have subjective fever or chills. On admission, she found to have low grade fever, tachycardia ,Leukocytosis and stool positive for STEC, ETEC and EAEC. She has started on Cipro and Flagyl and the ID consult requested to assist with further evaluation and antibiotic management.    # Sepsis ( Low grade fever + Tachycardia+ Leukocytosis)- resolving   # STEC/ETEC/EAEC Colitis vs Diverticulitis as per CT abd/pelvis    would recommend:    1. OOB to chair   2. Continue Cipro and flagyl for now    d/w House staff , Dr. Rivera regarding Qtc     Attending Attestation:    Spent more than 45 minutes on total encounter, more than 50 % of the visit was spent counseling and/or coordinating care by the Attending physician.       Patient is a 64y old  Female who is from home, ambulates independently, with PMH of SLE, arthritis, and depression, now presents to the ER for evaluation of nausea, vomiting, and diarrhea for the past 2 days.  Patient states her symptoms started yesterday around noon after eating shrimp prior.  Patient  feels very dehydrated, have subjective fever or chills. On admission, she found to have low grade fever, tachycardia ,Leukocytosis and stool positive for STEC, ETEC and EAEC. She has started on Cipro and Flagyl and the ID consult requested to assist with further evaluation and antibiotic management.    # Sepsis ( Low grade fever + Tachycardia+ Leukocytosis)- resolving   # STEC/ETEC/EAEC Colitis vs Diverticulitis as per CT abd/pelvis    would recommend:    1. OOB to chair   2. Continue Cipro ( QTC is less than 450 )and flagyl until 5/24/24    d/w House staff , Dr. Rivera regarding Qtc     Attending Attestation:    Spent more than 45 minutes on total encounter, more than 50 % of the visit was spent counseling and/or coordinating care by the Attending physician.

## 2024-05-25 LAB
CULTURE RESULTS: SIGNIFICANT CHANGE UP
CULTURE RESULTS: SIGNIFICANT CHANGE UP
SPECIMEN SOURCE: SIGNIFICANT CHANGE UP
SPECIMEN SOURCE: SIGNIFICANT CHANGE UP

## 2024-06-11 RX ORDER — TRAMADOL HYDROCHLORIDE 50 MG/1
1 TABLET ORAL
Refills: 0 | DISCHARGE

## 2024-06-11 RX ORDER — HYDROXYCHLOROQUINE SULFATE 200 MG
1 TABLET ORAL
Refills: 0 | DISCHARGE

## 2024-06-11 RX ORDER — LEFLUNOMIDE 10 MG/1
1 TABLET ORAL
Refills: 0 | DISCHARGE

## 2025-04-21 NOTE — DISCHARGE NOTE PROVIDER - NSDCMRMEDTOKEN_GEN_ALL_CORE_FT
[Weight management counseling provided] : Weight management [Healthy eating counseling provided] : healthy eating [Activity counseling provided] : activity [Target Wt Loss Goal ___] : Target weight loss goal [unfilled] lbs [Weigh Self Once Weekly] : Weigh self once weekly [Low Fat Diet] : Low fat diet [Low Salt Diet] : Low salt diet [Decrease Portions] : Decrease food portions [Keep Food Diary] : Keep food diary ciprofloxacin 500 mg oral tablet: 1 tab(s) orally every 12 hours  hydroxychloroquine 200 mg oral tablet: 1 tab(s) orally 2 times a day  leflunomide 10 mg oral tablet: 1 tab(s) orally once a day  metroNIDAZOLE 500 mg oral tablet: 1 tab(s) orally every 8 hours  traMADol 50 mg oral tablet: 1 tab(s) orally 3 times a day   [___ min/wk activity recommended] : [unfilled] min/wk activity recommended [Walking] : Walking [Sleep ___ hours/day] : Sleep [unfilled] hours/day [Engage in a relaxing activity] : Engage in a relaxing activity [Plan in advance] : Plan in advance [None] : None ciprofloxacin 500 mg oral tablet: 1 tab(s) orally every 12 hours Take one pill every 12 hours for two more days  hydroxychloroquine 200 mg oral tablet: 1 tab(s) orally 2 times a day  leflunomide 10 mg oral tablet: 1 tab(s) orally once a day  metroNIDAZOLE 500 mg oral tablet: 1 tab(s) orally every 8 hours Take one pill every 8 hours for two more days  traMADol 50 mg oral tablet: 1 tab(s) orally 3 times a day

## (undated) DEVICE — SOL IRR POUR NS 0.9% 500ML

## (undated) DEVICE — BIPOLAR FORCEP KIRWAN JEWELERS STR 4" X 0.4MM W 12FT CORD (GREEN)

## (undated) DEVICE — MIDAS REX LEGEND BALL DIAMOND LG BORE 6.0MM X 9CM

## (undated) DEVICE — SUT VICRYL 3-0 18" SH UNDYED (POP-OFF)

## (undated) DEVICE — PREP DURAPREP 26CC

## (undated) DEVICE — ELCTR GROUNDING PAD INFANT COVIDIEN

## (undated) DEVICE — LABELS BLANK W PEN

## (undated) DEVICE — DRSG TAPE HYPAFIX 4"

## (undated) DEVICE — DRSG CURITY GAUZE SPONGE 4 X 4" 12-PLY

## (undated) DEVICE — MARKING PEN W RULER

## (undated) DEVICE — POSITIONER STRAP ARMBOARD VELCRO TS-30

## (undated) DEVICE — MIDAS REX LEGEND MATCH HEAD FLUTED LG BORE 3.0MM X 14CM

## (undated) DEVICE — ELCTR GROUNDING PAD ADULT COVIDIEN

## (undated) DEVICE — STAPLER SKIN VISI-STAT 35 WIDE

## (undated) DEVICE — PREP BETADINE KIT

## (undated) DEVICE — GLV 8 PROTEXIS (WHITE)

## (undated) DEVICE — MIDAS REX LEGEND LUBRICANT DIFFUSER CARTRIDGE

## (undated) DEVICE — SUT MONOCRYL 5-0 18" P-3 UNDYED

## (undated) DEVICE — DRAPE SPLIT SHEET 77" X 120"

## (undated) DEVICE — Device

## (undated) DEVICE — DRSG TELFA 3 X 8

## (undated) DEVICE — SOL IRR POUR H2O 500ML

## (undated) DEVICE — SUT NUROLON 4-0 8-18" RB-1 (POP-OFF)

## (undated) DEVICE — SUT VICRYL 4-0 18" RB-1 UNDYED (POP-OFF)

## (undated) DEVICE — NDL HYPO REGULAR BEVEL 25G X 1.5" (BLUE)

## (undated) DEVICE — BLANKET LOWER BODY PEDS

## (undated) DEVICE — PACK NEURO MINOR

## (undated) DEVICE — DRAPE 3/4 SHEET 52X76"

## (undated) DEVICE — STAPLER SKIN MULTI DIRECTION W35

## (undated) DEVICE — WARMING BLANKET LOWER ADULT

## (undated) DEVICE — CANISTER DISPOSABLE THIN WALL 3000CC

## (undated) DEVICE — ELCTR COLORADO 3CM

## (undated) DEVICE — DRAPE TOWEL BLUE 17" X 24"